# Patient Record
Sex: FEMALE | Race: WHITE | Employment: OTHER | URBAN - METROPOLITAN AREA
[De-identification: names, ages, dates, MRNs, and addresses within clinical notes are randomized per-mention and may not be internally consistent; named-entity substitution may affect disease eponyms.]

---

## 2021-09-03 ENCOUNTER — DOCUMENTATION ONLY (OUTPATIENT)
Dept: ONCOLOGY | Age: 86
End: 2021-09-03

## 2021-09-03 ENCOUNTER — OFFICE VISIT (OUTPATIENT)
Dept: ONCOLOGY | Age: 86
End: 2021-09-03
Payer: MEDICARE

## 2021-09-03 ENCOUNTER — TELEPHONE (OUTPATIENT)
Dept: ONCOLOGY | Age: 86
End: 2021-09-03

## 2021-09-03 VITALS
BODY MASS INDEX: 22.14 KG/M2 | SYSTOLIC BLOOD PRESSURE: 120 MMHG | TEMPERATURE: 98 F | RESPIRATION RATE: 18 BRPM | HEART RATE: 58 BPM | DIASTOLIC BLOOD PRESSURE: 64 MMHG | WEIGHT: 129 LBS

## 2021-09-03 DIAGNOSIS — C67.9 MALIGNANT NEOPLASM OF URINARY BLADDER, UNSPECIFIED SITE (HCC): Primary | ICD-10-CM

## 2021-09-03 DIAGNOSIS — C34.92 MALIGNANT NEOPLASM OF LEFT LUNG, UNSPECIFIED PART OF LUNG (HCC): ICD-10-CM

## 2021-09-03 PROCEDURE — 1101F PT FALLS ASSESS-DOCD LE1/YR: CPT | Performed by: INTERNAL MEDICINE

## 2021-09-03 PROCEDURE — 99205 OFFICE O/P NEW HI 60 MIN: CPT | Performed by: INTERNAL MEDICINE

## 2021-09-03 PROCEDURE — G8536 NO DOC ELDER MAL SCRN: HCPCS | Performed by: INTERNAL MEDICINE

## 2021-09-03 PROCEDURE — G8510 SCR DEP NEG, NO PLAN REQD: HCPCS | Performed by: INTERNAL MEDICINE

## 2021-09-03 PROCEDURE — G8427 DOCREV CUR MEDS BY ELIG CLIN: HCPCS | Performed by: INTERNAL MEDICINE

## 2021-09-03 PROCEDURE — G8420 CALC BMI NORM PARAMETERS: HCPCS | Performed by: INTERNAL MEDICINE

## 2021-09-03 PROCEDURE — G0463 HOSPITAL OUTPT CLINIC VISIT: HCPCS | Performed by: INTERNAL MEDICINE

## 2021-09-03 PROCEDURE — 1090F PRES/ABSN URINE INCON ASSESS: CPT | Performed by: INTERNAL MEDICINE

## 2021-09-03 RX ORDER — FELODIPINE 5 MG/1
5 TABLET, EXTENDED RELEASE ORAL DAILY
COMMUNITY

## 2021-09-03 RX ORDER — ALENDRONATE SODIUM 10 MG/1
TABLET ORAL
COMMUNITY

## 2021-09-03 RX ORDER — GLUCOSAMINE/CHONDR SU A SOD 750-600 MG
TABLET ORAL
COMMUNITY

## 2021-09-03 RX ORDER — BISMUTH SUBSALICYLATE 262 MG
1 TABLET,CHEWABLE ORAL DAILY
COMMUNITY

## 2021-09-03 RX ORDER — SPIRONOLACTONE 25 MG/1
TABLET ORAL DAILY
COMMUNITY

## 2021-09-03 RX ORDER — OMEPRAZOLE 20 MG/1
25 CAPSULE, DELAYED RELEASE ORAL DAILY
COMMUNITY

## 2021-09-03 NOTE — TELEPHONE ENCOUNTER
9/3/21 5:44 PM: Per request of Dr. Celina Haney, faxed request to [place] [address] Records for lung biopsy results and CT results. Fax confirmation was received.

## 2021-09-03 NOTE — PROGRESS NOTES
[facility] [place] at [place] [address] [address], [address] [address], [address] [place] [address] Samuel  W: [phone]  F: [phone]    Reason for Visit:   Kenn Goodrich is a 80 y.o. female who is seen in consultation at the request of Dr. Kira Camacho for evaluation of Non muscle invasive bladder cancer     Treatment History:   · None from us yet    History of Present Illness:   Patient is a 80 y.o. female who is seen for recurrent NMIBC    She was initially diagnosed with high grade NMIBC in  and then in  when she had TURBT and a 6 week course of BCG. She was also enrolled on the QUILT trial at a point. She then had a TURBT and upper tract evaluation in  and 2020 which again showed HG NMIBC. CT IVP 2020 was unremarkable. She had a recurrence 2021 and 2021 with CIS ( HG). She was taken off the QUILT trial. Plans are for   Maintenance with BCG+ INF x 3. She  Does not want a cystectomy and comes to review options. She feels well apart from fatigue. She has no pain, cough, fevers, chills, sweats, falls, HA, rashes. She has been following with thoracic surgery at [address] [place] [address] for multiple lung nodules which she has had since 2019 and these have been enlarging. CT chest 2020 showed that a JOYCE nodule had increased to 3.5 x 3 x 4.2 cm from 2.4 x 2 x 3.4 cm with a solid component. She had a Left VATS 2020 with Dr. Arlen hO and had a pT1b adenocarcinoma. She has a remote h/o Lymphoma and Also has stable scleroderma. Past Medical History:   Diagnosis Date    Cancer [facility])     lymphoma    Hypertension       Past Surgical History:   Procedure Laterality Date    HX HEENT      tonsillectomy    HX ORTHOPAEDIC  2014    lumbar fracture      Social History     Tobacco Use    Smoking status: Former Smoker     Packs/day: 0.50     Years: 20.00     Pack years: 10.00     Quit date: 4/15/1984     Years since quittin.4    Smokeless tobacco: Never Used   Substance Use Topics    Alcohol use:  No Family History   Problem Relation Age of Onset    Cancer Father      Current Outpatient Medications   Medication Sig    HYDROcodone-acetaminophen (NORCO) 5-325 mg per tablet Take 1 Tab by mouth every four (4) hours as needed.  losartan (COZAAR) 25 mg tablet Take 25 mg by mouth daily.  hydrochlorothiazide (HYDRODIURIL) 25 mg tablet Take 25 mg by mouth daily.  therapeutic multivitamin (THERAGRAN) tablet Take 1 Tab by mouth daily.  DOCOSAHEXANOIC ACID/EPA (FISH OIL PO) Take 1 Cap by mouth daily.  CALCIUM CARBONATE/VITAMIN D3 (CALCIUM + D PO) Take 1 Tab by mouth daily. No current facility-administered medications for this visit. Allergies   Allergen Reactions    Benadryl [Diphenhydramine Hcl] Other (comments)     hyperactive        Review of Systems: A complete review of systems was obtained, negative except as described above. Physical Exam:     Visit Vitals  /64 (BP 1 Location: Right arm, BP Patient Position: Sitting)   Pulse (!) 58   Temp 98 °F (36.7 °C)   Resp 18   Wt 129 lb (58.5 kg)   BMI 22.14 kg/m²     ECOG PS: 0  General: No distress  Eyes: PERRLA, anicteric sclerae  HENT: Atraumatic  Neck: Supple  Lymphatic: No cervical, supraclavicular, or inguinal adenopathy  Respiratory:  normal respiratory effort  CV: Normal rate, regular rhythm, no peripheral edema  GI: Soft, nontender  MS: Normal gait and station. Digits without clubbing or cyanosis. Skin: No rashes, ecchymoses, or petechiae. Normal temperature, turgor, and texture. Psych: Alert, oriented, appropriate affect, normal judgment/insight    Results:     Lab Results   Component Value Date/Time    WBC 4.9 04/15/2014 04:49 PM    HGB 12.5 04/15/2014 04:49 PM    HCT 37.2 04/15/2014 04:49 PM    PLATELET 495 (L) 78/16/7577 04:49 PM    MCV 87.9 04/15/2014 04:49 PM    ABS.  NEUTROPHILS 3.7 04/15/2014 04:49 PM     Lab Results   Component Value Date/Time    Sodium 139 04/15/2014 04:49 PM    Potassium 4.0 04/15/2014 04:49 PM Chloride 105 04/15/2014 04:49 PM    CO2 29 04/15/2014 04:49 PM    Glucose 135 (H) 04/15/2014 04:49 PM    BUN 14 04/15/2014 04:49 PM    Creatinine 0.66 04/15/2014 04:49 PM    GFR est AA >60 04/15/2014 04:49 PM    GFR est non-AA >60 04/15/2014 04:49 PM    Calcium 9.4 04/15/2014 04:49 PM     Lab Results   Component Value Date/Time    Bilirubin, total 0.6 04/15/2014 04:49 PM    ALT (SGPT) 23 04/15/2014 04:49 PM    Alk. phosphatase 58 04/15/2014 04:49 PM    Protein, total 7.1 04/15/2014 04:49 PM    Albumin 4.0 04/15/2014 04:49 PM    Globulin 3.1 04/15/2014 04:49 PM       External records from [facility] and   Records reviewed and summarized above. Pathology report(s) reviewed above. Radiology report(s) reviewed above. 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  She is on her maintenance BCG  with INF now    If she has residual/ BCG refractory disease on repeat Cystoscopy ( defined as persistent or recurrent CIS within 12 months of being treated with BCG) then would ideally need a cystectomy . This would be curative. Risk of recurrent disease in 1 year is -  30% CIS, 40%, muscle-invasive disease, and 20-30%  metastatic disease in BCG refractory disease.      Other palliative options were reviewed  Has been previously evaluated for the QUILT-3.032 trial and has had recurrence despite. Hence I would consider Jami Martines should she forgo surgery.     In the single-arm phase II trial (Janece Officer) that included 97 patients with BCG-unresponsive CIS, with or without papillary Pembrolizumab for up to two years led to acomplete response rate of 41 percent at three months and 19 percent at 15 months. At a median follow-up of 24 months, no patients had progressed to muscle invasive bladder cancer or metastatic disease prior to cystectomy.  Among the 38 patients with persistent or recurrent disease following pembrolizumab who underwent cystectomy, three had muscle invasive (T2) disease on pathology.     All questions answered    2) Lung cancer    S/p VATS Upper lobe wedge 11/2020- [id number]- Dr. Andrew Dhaliwal  Scans done 9/2/2021 and requested    3) H/O Lymphoma   Says that she was on Prednisone for 7 years     4) Scleroderma and Raynaud's    5) Hep C  Treated     6) Psychosocial  Takes care of   Supportive daughter  Met with SW    Plan:     · Continue BCG  · Has Cystoscopy in 8 weeks  · See me 10 weeks   · If she does decide to receive Keytruda then will get baseline scans    I appreciate the opportunity to participate in Ms. Bonita ribeiro.     Signed By: King Kristel MD

## 2021-09-03 NOTE — PROGRESS NOTES
Oncology Social Work  Psychosocial Assessment    Reason for Assessment:      [] Social Work Referral [x] Initial Assessment [] New Diagnosis [] Other    Advance Care Planning:  Patient has an AMD, but not on file. Sources of Information:    [x]Patient  [x]Family  []Staff  []Medical Record    Mental Status:    [x]Alert  []Lethargic  []Unresponsive   [] Unable to assess   Oriented to:  [x]Person  [x]Place  [x]Time  [x]Situation      Relationship Status:  []Single  [x]  []Significant Other/Life Partner  []  []  []    Living Circumstances:  []Lives Alone  [x]Family/Significant Other in Household  []Roommates  []Children in the Home  []Paid Caregivers  []Assisted Living Facility/Group Home  []Skilled [address]  []Homeless  []Incarcerated  []Environmental/Care Concerns  []Other:    Employment Status:  []Employed Full-time []Employed Part-time []Homemaker  [x] Retired [] Short-Term Disability [] [facility]  [] Unemployed     Barriers to Learning:    []Language  []Developmental  []Cognitive  []Altered Mental Status  []Visual/Hearing Impairment  []Unable to Read/Write  []Motivational  [] Challenges Understanding Medical Jargon [x]No Barriers Identified      Financial/Legal Concerns:    []Uninsured  []Limited Income/Resources  []Non-Citizen  []Food Insecurity [x]No Concerns Identified   []Other:    Sikhism/Spiritual/Existential:  Does patient have any spiritual or Buddhist beliefs? [x] Yes [] No  Is the patient involved in a spiritual, sarabjit or Buddhist community? [x] Yes [] No  Patient expressing spiritual/existential angst? [] Yes [x] No  Notes: Patient is an active member of [employer] [facility].       Support System:    Identified Support Person/Group:  [x]Strong  []Fair  []Limited    Coping with Illness:   [x]  Coping Well  [] Challenges Coping with Serious Illness [] Situational Depression [] Situational Anxiety [] Anticipatory Grief  [] Recent Loss [] Caregiver [place]            Narrative:   Met with the patient during her office visit today. Patient is being evaluated for Non muscle invasive bladder cancer. The patient has a PCP - Dr. Joanie Rivas. Living Situation - Patient lives with her , in a one story home with basement. She shared that she has to go to the basement to do laundry. Her  suffers from short term memory challenges, and she serves as his caregiver. She does not use any DME to ambulate, and is independent with all ADLs and IADLs. Employment Status - The patient is retired. She worked as  at [employer] for many years. Her  is also retired. Insurance - Medicare A&B, and [employer]. Social Support - The patient has good support from family, friends, and Taoism family. The patient's daughter, Renard Newton, participated in her office visit today, via phone. Renard Newton is an employee with [employer] in IT. Resources provided -  Invited the patient to the upcoming Virtual Support Group meetings, led by this . Provided this 's contact information as well as information regarding the complementary services provided by the [facility], explaining that the center is currently closed due to COVID-19 restrictions. Explained that meditation, yoga, relationship counseling, and music therapy, are being offered virtually. Plan:   1. Introduced self and role of this  in the [facility]. 2.  Informed the patient of the [facility] and available resources there. 3.  Continue to meet with the patient when she returns to the clinic for ongoing assessment of the patients adjustment to her diagnosis and treatment. 4.  Ongoing psychosocial support as desired by patient. Referral:   No referrals placed at this time.    Bi Wells LCSW

## 2021-09-03 NOTE — PROGRESS NOTES
Tonny Childs is a 80 y.o. female    Chief Complaint   Patient presents with    New Patient     Bladder Cancer       1. Have you been to the ER, urgent care clinic since your last visit? Hospitalized since your last visit? No    2. Have you seen or consulted any other health care providers outside of the [address] [place] [address] since your last visit? Include any pap smears or colon screening.  Yes, [facility]

## 2022-03-19 PROBLEM — C34.92 MALIGNANT NEOPLASM OF LEFT LUNG (HCC): Status: ACTIVE | Noted: 2021-09-03

## 2022-03-20 PROBLEM — C67.9 MALIGNANT NEOPLASM OF URINARY BLADDER (HCC): Status: ACTIVE | Noted: 2021-09-03

## 2022-06-01 ENCOUNTER — TELEPHONE (OUTPATIENT)
Dept: ONCOLOGY | Age: 87
End: 2022-06-01

## 2022-06-01 NOTE — TELEPHONE ENCOUNTER
[facility]) request, called patient regarding a follow up appt. Patient has confirmed:  6/9/22 @ 11am.  Left voicemail for [facility]) with appt date/time.

## 2022-06-01 NOTE — TELEPHONE ENCOUNTER
Called patient regarding follow up appt, no answer, left message for patient to call the office to reschedule follow up.

## 2022-06-09 ENCOUNTER — OFFICE VISIT (OUTPATIENT)
Dept: ONCOLOGY | Age: 87
End: 2022-06-09
Payer: MEDICARE

## 2022-06-09 VITALS
BODY MASS INDEX: 21.63 KG/M2 | TEMPERATURE: 98.3 F | SYSTOLIC BLOOD PRESSURE: 104 MMHG | WEIGHT: 126 LBS | RESPIRATION RATE: 18 BRPM | HEART RATE: 80 BPM | DIASTOLIC BLOOD PRESSURE: 62 MMHG

## 2022-06-09 DIAGNOSIS — C67.9 MALIGNANT NEOPLASM OF URINARY BLADDER, UNSPECIFIED SITE (HCC): Primary | ICD-10-CM

## 2022-06-09 PROCEDURE — G8536 NO DOC ELDER MAL SCRN: HCPCS | Performed by: INTERNAL MEDICINE

## 2022-06-09 PROCEDURE — 1123F ACP DISCUSS/DSCN MKR DOCD: CPT | Performed by: INTERNAL MEDICINE

## 2022-06-09 PROCEDURE — 99214 OFFICE O/P EST MOD 30 MIN: CPT | Performed by: INTERNAL MEDICINE

## 2022-06-09 PROCEDURE — G8420 CALC BMI NORM PARAMETERS: HCPCS | Performed by: INTERNAL MEDICINE

## 2022-06-09 PROCEDURE — G8427 DOCREV CUR MEDS BY ELIG CLIN: HCPCS | Performed by: INTERNAL MEDICINE

## 2022-06-09 PROCEDURE — 1090F PRES/ABSN URINE INCON ASSESS: CPT | Performed by: INTERNAL MEDICINE

## 2022-06-09 PROCEDURE — 1101F PT FALLS ASSESS-DOCD LE1/YR: CPT | Performed by: INTERNAL MEDICINE

## 2022-06-09 PROCEDURE — G0463 HOSPITAL OUTPT CLINIC VISIT: HCPCS | Performed by: INTERNAL MEDICINE

## 2022-06-09 PROCEDURE — G8432 DEP SCR NOT DOC, RNG: HCPCS | Performed by: INTERNAL MEDICINE

## 2022-06-09 NOTE — PROGRESS NOTES
Meño Lynn is a 80 y.o. female    Chief Complaint   Patient presents with    Follow-up      Non muscle invasive bladder cancer        1. Have you been to the ER, urgent care clinic since your last visit? Hospitalized since your last visit? No    2. Have you seen or consulted any other health care providers outside of the [address] [place] [place] [address] since your last visit? Include any pap smears or colon screening.  Yes, Dr[facility]

## 2022-06-09 NOTE — PROGRESS NOTES
[facility] [place] at [place] [address] [address], [address]  W: [phone]  F: [phone]    Reason for Visit:   Silvia Sierra is a 80 y.o. female who is seen  for evaluation of Non muscle invasive bladder cancer     Treatment History:   · None from us yet    History of Present Illness:   Patient is a 80 y.o. female who is seen for recurrent NMIBC    She was initially diagnosed with high grade NMIBC in  and then in  when she had TURBT and a 6 week course of BCG. She was also enrolled on the QUILT trial at a point. She then had a TURBT and upper tract evaluation in  and 2020 which again showed HG NMIBC. CT IVP 2020 was unremarkable. She had a recurrence 2021 and 2021 with CIS ( HG). She was taken off the QUILT trial. Was on Maintenance with BCG+ INF x 3. She  Does not want a cystectomy. Had another Cysto 2022 with residual CIS. She has no pain and no bleeding. She has been following with thoracic surgery at [facility] for multiple lung nodules which she has had since  and these have been enlarging. CT chest 2020 showed that a JOYCE nodule had increased to 3.5 x 3 x 4.2 cm from 2.4 x 2 x 3.4 cm with a solid component. She had a Left VATS 2020 with Dr. Shiraz Jones and had a pT1b adenocarcinoma. She has a remote h/o Lymphoma and Also has stable scleroderma. Her  is unwell and is totally dependent on her.      Past Medical History:   Diagnosis Date    Cancer [facility])     lymphoma    Hypertension       Past Surgical History:   Procedure Laterality Date    HX HEENT      tonsillectomy    HX ORTHOPAEDIC  2014    lumbar fracture      Social History     Tobacco Use    Smoking status: Former Smoker     Packs/day: 0.50     Years: 20.00     Pack years: 10.00     Quit date: 4/15/1984     Years since quittin.1    Smokeless tobacco: Never Used   Substance Use Topics    Alcohol use: No      Family History   Problem Relation Age of Onset    Cancer Father      Current Outpatient Medications   Medication Sig    felodipine (PLENDIL SR) 5 mg 24 hr tablet Take 5 mg by mouth daily.  spironolactone (ALDACTONE) 25 mg tablet Take  by mouth daily.  omeprazole (PRILOSEC) 20 mg capsule Take 25 mg by mouth daily.  alendronate (FOSAMAX) 10 mg tablet Take  by mouth Daily (before breakfast).  multivitamin (ONE A DAY) tablet Take 1 Tablet by mouth daily.  Biotin 2,500 mcg cap Take  by mouth.  bimatoprost (LUMIGAN) 0.01 % ophthalmic drops Administer 1 Drop to both eyes every evening.  HYDROcodone-acetaminophen (NORCO) 5-325 mg per tablet Take 1 Tab by mouth every four (4) hours as needed. (Patient not taking: Reported on 9/3/2021)    losartan (COZAAR) 25 mg tablet Take 25 mg by mouth daily.  hydrochlorothiazide (HYDRODIURIL) 25 mg tablet Take 25 mg by mouth daily.  therapeutic multivitamin (THERAGRAN) tablet Take 1 Tab by mouth daily. (Patient not taking: Reported on 9/3/2021)    DOCOSAHEXANOIC ACID/EPA (FISH OIL PO) Take 1 Cap by mouth daily. (Patient not taking: Reported on 9/3/2021)    CALCIUM CARBONATE/VITAMIN D3 (CALCIUM + D PO) Take 1 Tab by mouth daily. No current facility-administered medications for this visit. Allergies   Allergen Reactions    Benadryl [Diphenhydramine Hcl] Other (comments)     hyperactive        Review of Systems: A complete review of systems was obtained, negative except as described above. Physical Exam:     Visit Vitals  /62 (BP 1 Location: Left upper arm, BP Patient Position: Sitting)   Pulse 80   Temp 98.3 °F (36.8 °C)   Resp 18   Wt 126 lb (57.2 kg)   BMI 21.63 kg/m²     ECOG PS: 0  General: No distress  Eyes: PERRLA, anicteric sclerae  HENT: Atraumatic  Neck: Supple  Skin: No rashes, ecchymoses, or petechiae. Normal temperature, turgor, and texture.   Psych: Alert, oriented, appropriate affect, normal judgment/insight    Results:     Lab Results   Component Value Date/Time    WBC 4.9 04/15/2014 04:49 PM    HGB 12.5 04/15/2014 04:49 PM    HCT 37.2 04/15/2014 04:49 PM    PLATELET 667 (L) 62/37/5255 04:49 PM    MCV 87.9 04/15/2014 04:49 PM    ABS. NEUTROPHILS 3.7 04/15/2014 04:49 PM     Lab Results   Component Value Date/Time    Sodium 139 04/15/2014 04:49 PM    Potassium 4.0 04/15/2014 04:49 PM    Chloride 105 04/15/2014 04:49 PM    CO2 29 04/15/2014 04:49 PM    Glucose 135 (H) 04/15/2014 04:49 PM    BUN 14 04/15/2014 04:49 PM    Creatinine 0.66 04/15/2014 04:49 PM    GFR est AA >60 04/15/2014 04:49 PM    GFR est non-AA >60 04/15/2014 04:49 PM    Calcium 9.4 04/15/2014 04:49 PM     Lab Results   Component Value Date/Time    Bilirubin, total 0.6 04/15/2014 04:49 PM    ALT (SGPT) 23 04/15/2014 04:49 PM    Alk. phosphatase 58 04/15/2014 04:49 PM    Protein, total 7.1 04/15/2014 04:49 PM    Albumin 4.0 04/15/2014 04:49 PM    Globulin 3.1 04/15/2014 04:49 PM       External records from [facility]  Records reviewed and summarized above. Pathology report(s) reviewed above. Radiology report(s) reviewed above. CT 3/2022   In this patient with history of hematuria, no suspicious renal mass is identified. Multiple subcentimeter cortical renal cysts are noted bilaterally, too small to characterize. No renal or ureteral calculus. A few punctate vascular calcifications associated with both kidneys. 2.  Distal left ureter is mildly dilated just prior to reaching the left ureterovesical junction. Both the distal right ureter and the distal left ureter appear to be narrowed as they reach their respective ureterovesical junctions. No visible mass appreciated. Please correlate clinically, consider cystoscopy. 3.  Pancolonic diverticulosis without evidence of diverticulitis. 4.  Extensive atherosclerotic aortic calcifications. 5.  No lymphadenopathy. 6.  Pectus deformity noted.  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  She is on her maintenance BCG  with INF now  5/2022 has recurrent HG CIS also involves the left ureteral orifice. Has BCG refractory disease on repeat Cystoscopy ( defined as persistent or recurrent CIS within 12 months of being treated with BCG), would ideally need a cystectomy . This would be curative. Risk of recurrent disease in 1 year is -  30% CIS, 40%, muscle-invasive disease, and 20-30%  metastatic disease in BCG refractory disease.      Other palliative options were reviewed  Has been previously evaluated for the QUILT-3.032 trial and has had recurrence despite. Hence I would consider [facility] should she forgo surgery.     In the single-arm phase II trial (Rahul Moreno) that included 97 patients with BCG-unresponsive CIS,  Pembrolizumab for up to two years led to acomplete response rate of 41 percent at three months and 19 percent at 15 months. At a median follow-up of 24 months, no patients had progressed to muscle invasive bladder cancer or metastatic disease prior to cystectomy. Among the 38 patients with persistent or recurrent disease following pembrolizumab who underwent cystectomy, three had muscle invasive (T2) disease on pathology.     All questions answered    I had a long discussion about Keytruda and side effects, including some risk of major/ permanent side effects  She is neither inclined to cystectomy nor Keytruda  She will revisit options with Dr. Rita Spencer and get back to me     2) Lung cancer    S/p VATS Upper lobe wedge 11/2020- [id number]- Dr. Sandoval Bur  Scans done 3/2022     3) H/O Lymphoma   Says that she was on Prednisone for 7 years     4) Scleroderma and Raynaud's    5) Hep C  Treated     6) Psychosocial  Takes care of   Supportive daughter  Plan:       · If she does decide to receive Keytruda then she will call our office.  Wants to talk to  Loki Prasad     I appreciate the opportunity to participate in Ms. Lynn Saliva care.     Signed By: Marnie Lua MD

## 2023-05-16 RX ORDER — HYDROCHLOROTHIAZIDE 25 MG/1
25 TABLET ORAL DAILY
COMMUNITY

## 2023-05-16 RX ORDER — HYDROCODONE BITARTRATE AND ACETAMINOPHEN 5; 325 MG/1; MG/1
1 TABLET ORAL EVERY 4 HOURS PRN
COMMUNITY
Start: 2014-04-17

## 2023-05-16 RX ORDER — OMEPRAZOLE 20 MG/1
25 CAPSULE, DELAYED RELEASE ORAL DAILY
COMMUNITY

## 2023-05-16 RX ORDER — FELODIPINE 5 MG/1
5 TABLET, EXTENDED RELEASE ORAL DAILY
COMMUNITY

## 2023-05-16 RX ORDER — ALENDRONATE SODIUM 10 MG/1
TABLET ORAL
COMMUNITY

## 2023-05-16 RX ORDER — SPIRONOLACTONE 25 MG/1
TABLET ORAL DAILY
COMMUNITY

## 2023-05-16 RX ORDER — LOSARTAN POTASSIUM 25 MG/1
25 TABLET ORAL DAILY
COMMUNITY

## 2023-05-30 ENCOUNTER — APPOINTMENT (OUTPATIENT)
Facility: HOSPITAL | Age: 88
End: 2023-05-30
Attending: STUDENT IN AN ORGANIZED HEALTH CARE EDUCATION/TRAINING PROGRAM

## 2023-05-30 ENCOUNTER — HOSPITAL ENCOUNTER (EMERGENCY)
Facility: HOSPITAL | Age: 88
Discharge: LEFT BEFORE DISCHARGE | End: 2023-05-30

## 2023-05-30 VITALS
BODY MASS INDEX: 22.72 KG/M2 | RESPIRATION RATE: 16 BRPM | TEMPERATURE: 97.5 F | DIASTOLIC BLOOD PRESSURE: 82 MMHG | HEIGHT: 62 IN | SYSTOLIC BLOOD PRESSURE: 139 MMHG | OXYGEN SATURATION: 98 % | WEIGHT: 123.46 LBS | HEART RATE: 69 BPM

## 2023-05-30 PROCEDURE — 73562 X-RAY EXAM OF KNEE 3: CPT

## 2023-05-30 PROCEDURE — 99283 EMERGENCY DEPT VISIT LOW MDM: CPT

## 2023-05-30 PROCEDURE — 4500000002 HC ER NO CHARGE

## 2023-05-30 ASSESSMENT — PAIN DESCRIPTION - DESCRIPTORS: DESCRIPTORS: ACHING

## 2023-05-30 ASSESSMENT — PAIN SCALES - GENERAL: PAINLEVEL_OUTOF10: 5

## 2023-05-30 ASSESSMENT — PAIN - FUNCTIONAL ASSESSMENT: PAIN_FUNCTIONAL_ASSESSMENT: 0-10

## 2023-05-30 ASSESSMENT — PAIN DESCRIPTION - ORIENTATION: ORIENTATION: LEFT

## 2023-05-30 ASSESSMENT — PAIN DESCRIPTION - FREQUENCY: FREQUENCY: INTERMITTENT

## 2023-05-30 ASSESSMENT — PAIN DESCRIPTION - LOCATION: LOCATION: KNEE

## 2023-05-30 NOTE — ED TRIAGE NOTES
Patient is ambulatory in Triage with a walker. Reports fall yesterday forward, tripped, pain to the left knee, no blood thinner medications.

## 2023-06-26 ENCOUNTER — TELEPHONE (OUTPATIENT)
Age: 88
End: 2023-06-26

## 2023-08-10 ENCOUNTER — TELEPHONE (OUTPATIENT)
Age: 88
End: 2023-08-10

## 2024-02-26 ENCOUNTER — OFFICE VISIT (OUTPATIENT)
Facility: CLINIC | Age: 89
End: 2024-02-26
Payer: MEDICARE

## 2024-02-26 DIAGNOSIS — S72.002D CLOSED FRACTURE OF LEFT HIP WITH ROUTINE HEALING, SUBSEQUENT ENCOUNTER: Primary | ICD-10-CM

## 2024-02-26 PROBLEM — S72.002A CLOSED FRACTURE OF LEFT HIP (HCC): Status: ACTIVE | Noted: 2024-02-26

## 2024-02-26 PROCEDURE — G8484 FLU IMMUNIZE NO ADMIN: HCPCS | Performed by: INTERNAL MEDICINE

## 2024-02-26 PROCEDURE — 99306 1ST NF CARE HIGH MDM 50: CPT | Performed by: INTERNAL MEDICINE

## 2024-02-26 PROCEDURE — 1123F ACP DISCUSS/DSCN MKR DOCD: CPT | Performed by: INTERNAL MEDICINE

## 2024-02-26 NOTE — PROGRESS NOTES
PLACE OF SERVICE:  [place] [address] [place] [address]    H & P    Chief Complaint:    Left hip fracture  Hyponatremia      HPI : Lesly Ybarra is a 88 y.o. female history of hypertension anxiety bladder CA who fell at home while trying to lift her  who had fallen to the floor.  Patient landed on the floor hit her left hip.  She was brought to the emergency room and imaging confirmed a left hip intertrochanteric fracture.  She also had urinary retention Garcias was placed with 500 cc of urine collected in the urine bag.  Patient was admitted to the trauma team and seen by orthopedic underwent repair of left hip fracture with IM nail.  Postoperative course was complicated by acute blood loss anemia required 1 units of packed cells.  Patient also became hyponatremic serum sodium dropping to 113.  She was seen by nephrology HCTZ spironolactone were discontinued she was given 100 cc of 3% saline.  Hyponatremia was corrected slowly over the next few days sodium was 127 at the time of discharge.  Patient was also seen by psychiatry because of depression and anxiety.  SSRI Zoloft were discontinued due to hyponatremia.  Patient is admitted here for skilled rehab.  She has been complaining of lower abdominal discomfort not associated with urination.  Upon interrogation with the nursing staff and daughter she tells me she did void yesterday.  There is no constipation or diarrhea.  Daughter tells me she also has back pain.  Order new mattress and if needed Lidoderm patch.  Patient is on oxycodone 3 times a day as needed avoiding escalating the pain medicine due to concern for sedation.    PMH:   Hypertension  Bladder cancer  Anxiety depression  Osteoporosis    ROS:   The following system review was negative:  Constitutional; Respiratory; Cardiovascular; Genitourinary; Gastrointestinal; Psychiatric; Ear-Nose-Throat; Musculoskeletal; Neurologic; Endocrine; Hematologic; Skin; Eyes; denies

## 2024-02-27 ENCOUNTER — OFFICE VISIT (OUTPATIENT)
Facility: CLINIC | Age: 89
End: 2024-02-27
Payer: MEDICARE

## 2024-02-27 DIAGNOSIS — M62.81 MUSCLE WEAKNESS: Primary | ICD-10-CM

## 2024-02-27 DIAGNOSIS — C34.92 MALIGNANT NEOPLASM OF UNSPECIFIED PART OF LEFT BRONCHUS OR LUNG (HCC): ICD-10-CM

## 2024-02-27 PROCEDURE — 1123F ACP DISCUSS/DSCN MKR DOCD: CPT | Performed by: INTERNAL MEDICINE

## 2024-02-27 PROCEDURE — 99309 SBSQ NF CARE MODERATE MDM 30: CPT | Performed by: INTERNAL MEDICINE

## 2024-02-27 PROCEDURE — G8484 FLU IMMUNIZE NO ADMIN: HCPCS | Performed by: INTERNAL MEDICINE

## 2024-02-28 ENCOUNTER — OFFICE VISIT (OUTPATIENT)
Facility: CLINIC | Age: 89
End: 2024-02-28
Payer: MEDICARE

## 2024-02-28 DIAGNOSIS — S72.002D CLOSED FRACTURE OF LEFT HIP WITH ROUTINE HEALING, SUBSEQUENT ENCOUNTER: Primary | ICD-10-CM

## 2024-02-28 PROCEDURE — 1123F ACP DISCUSS/DSCN MKR DOCD: CPT | Performed by: INTERNAL MEDICINE

## 2024-02-28 PROCEDURE — 99309 SBSQ NF CARE MODERATE MDM 30: CPT | Performed by: INTERNAL MEDICINE

## 2024-02-28 PROCEDURE — G8484 FLU IMMUNIZE NO ADMIN: HCPCS | Performed by: INTERNAL MEDICINE

## 2024-02-28 NOTE — PROGRESS NOTES
PLACE OF SERVICE:  [facility] [address] [place] [address]    2/28/2024      Chief Complaint:    Left hip fracture  Hyponatremia      HPI : Lesly Ybarra is a 88 y.o. female history of hypertension anxiety bladder CA who fell at home while trying to lift her  who had fallen to the floor.  Patient landed on the floor hit her left hip.  She was brought to the emergency room and imaging confirmed a left hip intertrochanteric fracture.  She also had urinary retention Garcias was placed with 500 cc of urine collected in the urine bag.  Patient was admitted to the trauma team and seen by orthopedic underwent repair of left hip fracture with IM nail.  Postoperative course was complicated by acute blood loss anemia required 1 units of packed cells.  Patient is seen for skilled visit.  Does not complain of significant hip pain.  She has a lot of anxiety her  who is present at this facility not doing well and comfort measures.  Daughter is asking for some anxiety medicine for her.  Patient has been voiding.  No urinary retention.    PMH:   Hypertension  Bladder cancer  Anxiety depression  Osteoporosis    ROS:   The following system review was negative:  Constitutional; Respiratory; Cardiovascular; Genitourinary; Gastrointestinal; Psychiatric; Ear-Nose-Throat; Musculoskeletal; Neurologic; Endocrine; Hematologic; Skin; Eyes; denies any    Medications: Reviewed in EMR and assessment and plan    Social History:    Family History:  non contributory     Code Status:Full code    Allergies: Reviewed  Allergies   Allergen Reactions    Amlodipine Cough     Reaction Type: Side Effect    Diphenhydramine Other (See Comments)     hyperactive    Lisinopril Cough       Medications: Reviewed    Vitals: 138/62 P 78 R 16 Temp 97.3      Exam:  Constitutional: No acute distress;   Eyes: Sclera clear, PERRLA;   Ears/nose/mouth/throat:mmm, OP clear, trachea midline;  Cardiovascular: RRR,nml S1 and S2,

## 2024-02-28 NOTE — PROGRESS NOTES
PLACE OF SERVICE:  [facility] [address] [place] [address]    SKILLED VISIT    2/27/2024      Chief Complaint:    Left hip fracture  Hyponatremia      HPI : Lesly Ybarra is a 88 y.o. female history of hypertension anxiety bladder CA who fell at home while trying to lift her  who had fallen to the floor.  Patient landed on the floor hit her left hip.  She was brought to the emergency room and imaging confirmed a left hip intertrochanteric fracture.  She also had urinary retention Garcias was placed with 500 cc of urine collected in the urine bag.  Patient was admitted to the trauma team and seen by orthopedic underwent repair of left hip fracture with IM nail.  Postoperative course was complicated by acute blood loss anemia required 1 units of packed cells.  Patient also became hyponatremic serum sodium dropping to 113.  She was seen by nephrology HCTZ spironolactone were discontinued she was given 100 cc of 3% saline.  Hyponatremia was corrected slowly over the next few days sodium was 127 at the time of discharge.  Patient was also seen by psychiatry because of depression and anxiety.  SSRI Zoloft were discontinued due to hyponatremia.  Patient is admitted here for skilled rehab.  She is seen for follow-up.  She is complaining of urinary frequency no hematuria no dysuria no flank pain she has been voiding okay.  Will add oxybutynin for overactive bladder.    PMH:   Hypertension  Bladder cancer  Anxiety depression  Osteoporosis    ROS:   The following system review was negative:  Constitutional; Respiratory; Cardiovascular; Genitourinary; Gastrointestinal; Psychiatric; Ear-Nose-Throat; Musculoskeletal; Neurologic; Endocrine; Hematologic; Skin; Eyes; denies any    Medications: Reviewed in EMR and assessment and plan    Social History:    Family History:  non contributory     Code Status:Full code    Allergies: Reviewed  Allergies   Allergen Reactions    Amlodipine Cough

## 2024-02-29 ENCOUNTER — OFFICE VISIT (OUTPATIENT)
Facility: CLINIC | Age: 89
End: 2024-02-29

## 2024-02-29 VITALS
BODY MASS INDEX: 21.03 KG/M2 | HEART RATE: 85 BPM | SYSTOLIC BLOOD PRESSURE: 94 MMHG | OXYGEN SATURATION: 93 % | WEIGHT: 115 LBS | DIASTOLIC BLOOD PRESSURE: 60 MMHG | RESPIRATION RATE: 18 BRPM | TEMPERATURE: 97 F

## 2024-02-29 DIAGNOSIS — F32.A ANXIETY AND DEPRESSION: ICD-10-CM

## 2024-02-29 DIAGNOSIS — D64.9 ANEMIA, UNSPECIFIED TYPE: ICD-10-CM

## 2024-02-29 DIAGNOSIS — I10 PRIMARY HYPERTENSION: ICD-10-CM

## 2024-02-29 DIAGNOSIS — S72.002D CLOSED FRACTURE OF LEFT HIP WITH ROUTINE HEALING, SUBSEQUENT ENCOUNTER: ICD-10-CM

## 2024-02-29 DIAGNOSIS — E87.1 HYPONATREMIA: ICD-10-CM

## 2024-02-29 DIAGNOSIS — F41.9 ANXIETY AND DEPRESSION: ICD-10-CM

## 2024-02-29 DIAGNOSIS — N32.89 BLADDER SPASM: ICD-10-CM

## 2024-02-29 ASSESSMENT — ENCOUNTER SYMPTOMS
GASTROINTESTINAL NEGATIVE: 1
RESPIRATORY NEGATIVE: 1

## 2024-02-29 NOTE — PROGRESS NOTES
were discontinued in hospita due to hyponatremia.   3. Hyponatremia  Overview:  During hospitalization. Corrected with 3% saline.  Sodium on hospital discharge was 127  Repeat BMP ordered 3/4/24.   4. Anxiety and depression  Overview:  Continue zoloft  Continue lorazepam 0.5 every 12 as needed  Monitor mood and behavior  5. Anemia, unspecified type  Overview:  Due to acute blood loss   Required 1 unit PRBCs in hospital  Hemoglobin on discharge 8.9  Continue PO iron  Repeat CBC ordered  No overt signs of bleeding noted.  6. Bladder spasm  Overview:  Continue oxybutyin 5mg BID.  UA with culture to r/o UTI             TENZIN Waller - NP

## 2024-03-01 ENCOUNTER — OFFICE VISIT (OUTPATIENT)
Facility: CLINIC | Age: 89
End: 2024-03-01

## 2024-03-01 ENCOUNTER — HOSPITAL ENCOUNTER (INPATIENT)
Facility: HOSPITAL | Age: 89
LOS: 6 days | Discharge: NURSING, REHAB OR LONG-TERM CARE | DRG: 643 | End: 2024-03-07
Attending: STUDENT IN AN ORGANIZED HEALTH CARE EDUCATION/TRAINING PROGRAM | Admitting: INTERNAL MEDICINE
Payer: MEDICARE

## 2024-03-01 ENCOUNTER — APPOINTMENT (OUTPATIENT)
Facility: HOSPITAL | Age: 89
DRG: 643 | End: 2024-03-01
Payer: MEDICARE

## 2024-03-01 DIAGNOSIS — S72.002D CLOSED FRACTURE OF LEFT HIP WITH ROUTINE HEALING, SUBSEQUENT ENCOUNTER: Primary | ICD-10-CM

## 2024-03-01 DIAGNOSIS — R40.0 SOMNOLENCE: ICD-10-CM

## 2024-03-01 DIAGNOSIS — E87.1 HYPONATREMIA: Primary | ICD-10-CM

## 2024-03-01 DIAGNOSIS — N30.00 ACUTE CYSTITIS WITHOUT HEMATURIA: ICD-10-CM

## 2024-03-01 DIAGNOSIS — M25.552 PAIN OF LEFT HIP: ICD-10-CM

## 2024-03-01 LAB
ALBUMIN SERPL-MCNC: 2.8 G/DL (ref 3.5–5)
ALBUMIN/GLOB SERPL: 0.8 (ref 1.1–2.2)
ALP SERPL-CCNC: 101 U/L (ref 45–117)
ALT SERPL-CCNC: 26 U/L (ref 12–78)
ANION GAP BLD CALC-SCNC: 9 (ref 10–20)
ANION GAP SERPL CALC-SCNC: 7 MMOL/L (ref 5–15)
ANION GAP SERPL CALC-SCNC: 8 MMOL/L (ref 5–15)
APPEARANCE UR: CLEAR
AST SERPL-CCNC: 28 U/L (ref 15–37)
BACTERIA URNS QL MICRO: ABNORMAL /HPF
BASE DEFICIT BLD-SCNC: 2.3 MMOL/L
BASOPHILS # BLD: 0 K/UL (ref 0–0.1)
BASOPHILS NFR BLD: 0 % (ref 0–1)
BILIRUB SERPL-MCNC: 1.5 MG/DL (ref 0.2–1)
BILIRUB UR QL: NEGATIVE
BUN SERPL-MCNC: 11 MG/DL (ref 6–20)
BUN SERPL-MCNC: 11 MG/DL (ref 6–20)
BUN/CREAT SERPL: 26 (ref 12–20)
BUN/CREAT SERPL: 31 (ref 12–20)
CA-I BLD-MCNC: 1.2 MMOL/L (ref 1.12–1.32)
CALCIUM SERPL-MCNC: 8.1 MG/DL (ref 8.5–10.1)
CALCIUM SERPL-MCNC: 8.7 MG/DL (ref 8.5–10.1)
CHLORIDE BLD-SCNC: 85 MMOL/L (ref 100–108)
CHLORIDE SERPL-SCNC: 86 MMOL/L (ref 97–108)
CHLORIDE SERPL-SCNC: 93 MMOL/L (ref 97–108)
CO2 BLD-SCNC: 21 MMOL/L (ref 19–24)
CO2 SERPL-SCNC: 21 MMOL/L (ref 21–32)
CO2 SERPL-SCNC: 23 MMOL/L (ref 21–32)
COLOR UR: ABNORMAL
COMMENT:: NORMAL
CREAT SERPL-MCNC: 0.35 MG/DL (ref 0.55–1.02)
CREAT SERPL-MCNC: 0.43 MG/DL (ref 0.55–1.02)
CREAT UR-MCNC: 0.4 MG/DL (ref 0.6–1.3)
DIFFERENTIAL METHOD BLD: ABNORMAL
EKG ATRIAL RATE: 95 BPM
EKG DIAGNOSIS: NORMAL
EKG P AXIS: 64 DEGREES
EKG P-R INTERVAL: 196 MS
EKG Q-T INTERVAL: 360 MS
EKG QRS DURATION: 80 MS
EKG QTC CALCULATION (BAZETT): 452 MS
EKG R AXIS: -15 DEGREES
EKG T AXIS: 25 DEGREES
EKG VENTRICULAR RATE: 95 BPM
EOSINOPHIL # BLD: 0 K/UL (ref 0–0.4)
EOSINOPHIL NFR BLD: 0 % (ref 0–7)
EPITH CASTS URNS QL MICRO: ABNORMAL /LPF
ERYTHROCYTE [DISTWIDTH] IN BLOOD BY AUTOMATED COUNT: 15.4 % (ref 11.5–14.5)
GLOBULIN SER CALC-MCNC: 3.5 G/DL (ref 2–4)
GLUCOSE BLD STRIP.AUTO-MCNC: 162 MG/DL (ref 65–117)
GLUCOSE BLD STRIP.AUTO-MCNC: 69 MG/DL (ref 65–117)
GLUCOSE BLD STRIP.AUTO-MCNC: 75 MG/DL (ref 74–106)
GLUCOSE SERPL-MCNC: 74 MG/DL (ref 65–100)
GLUCOSE SERPL-MCNC: 79 MG/DL (ref 65–100)
GLUCOSE UR STRIP.AUTO-MCNC: NEGATIVE MG/DL
HCO3 BLDA-SCNC: 22 MMOL/L
HCT VFR BLD AUTO: 27.7 % (ref 35–47)
HGB BLD-MCNC: 9.8 G/DL (ref 11.5–16)
HGB UR QL STRIP: NEGATIVE
HYALINE CASTS URNS QL MICRO: ABNORMAL /LPF (ref 0–5)
IMM GRANULOCYTES # BLD AUTO: 0.1 K/UL (ref 0–0.04)
IMM GRANULOCYTES NFR BLD AUTO: 1 % (ref 0–0.5)
KETONES UR QL STRIP.AUTO: 15 MG/DL
LACTATE BLD-SCNC: 0.68 MMOL/L (ref 0.4–2)
LEUKOCYTE ESTERASE UR QL STRIP.AUTO: ABNORMAL
LYMPHOCYTES # BLD: 0.2 K/UL (ref 0.8–3.5)
LYMPHOCYTES NFR BLD: 2 % (ref 12–49)
MAGNESIUM SERPL-MCNC: 1.9 MG/DL (ref 1.6–2.4)
MCH RBC QN AUTO: 31.1 PG (ref 26–34)
MCHC RBC AUTO-ENTMCNC: 35.4 G/DL (ref 30–36.5)
MCV RBC AUTO: 87.9 FL (ref 80–99)
MONOCYTES # BLD: 0.8 K/UL (ref 0–1)
MONOCYTES NFR BLD: 8 % (ref 5–13)
NEUTS SEG # BLD: 8.9 K/UL (ref 1.8–8)
NEUTS SEG NFR BLD: 89 % (ref 32–75)
NITRITE UR QL STRIP.AUTO: POSITIVE
NRBC # BLD: 0 K/UL (ref 0–0.01)
NRBC BLD-RTO: 0 PER 100 WBC
OSMOLALITY SERPL: 248 MOSM/KG H2O
OSMOLALITY UR: 300 MOSM/KG H2O
OSMOLALITY UR: 300 MOSM/KG H2O
PCO2 BLDV: 33.2 MMHG (ref 41–51)
PH BLDV: 7.42 (ref 7.32–7.42)
PH UR STRIP: 6.5 (ref 5–8)
PHOSPHATE SERPL-MCNC: 2.2 MG/DL (ref 2.6–4.7)
PLATELET # BLD AUTO: 364 K/UL (ref 150–400)
PMV BLD AUTO: 8.8 FL (ref 8.9–12.9)
PO2 BLDV: <27 MMHG (ref 25–40)
POTASSIUM BLD-SCNC: 4.2 MMOL/L (ref 3.5–5.5)
POTASSIUM SERPL-SCNC: 3.7 MMOL/L (ref 3.5–5.1)
POTASSIUM SERPL-SCNC: 4 MMOL/L (ref 3.5–5.1)
PROT SERPL-MCNC: 6.3 G/DL (ref 6.4–8.2)
PROT UR STRIP-MCNC: ABNORMAL MG/DL
RBC # BLD AUTO: 3.15 M/UL (ref 3.8–5.2)
RBC #/AREA URNS HPF: ABNORMAL /HPF (ref 0–5)
RBC MORPH BLD: ABNORMAL
SERVICE CMNT-IMP: ABNORMAL
SERVICE CMNT-IMP: ABNORMAL
SERVICE CMNT-IMP: NORMAL
SODIUM BLD-SCNC: 115 MMOL/L (ref 136–145)
SODIUM SERPL-SCNC: 117 MMOL/L (ref 136–145)
SODIUM SERPL-SCNC: 121 MMOL/L (ref 136–145)
SODIUM UR-SCNC: 18 MMOL/L
SODIUM UR-SCNC: 19 MMOL/L
SP GR UR REFRACTOMETRY: 1.01 (ref 1–1.03)
SPECIMEN HOLD: NORMAL
SPECIMEN HOLD: NORMAL
SPECIMEN SITE: ABNORMAL
UROBILINOGEN UR QL STRIP.AUTO: 1 EU/DL (ref 0.2–1)
WBC # BLD AUTO: 10 K/UL (ref 3.6–11)
WBC URNS QL MICRO: ABNORMAL /HPF (ref 0–4)

## 2024-03-01 PROCEDURE — 81001 URINALYSIS AUTO W/SCOPE: CPT

## 2024-03-01 PROCEDURE — 96360 HYDRATION IV INFUSION INIT: CPT

## 2024-03-01 PROCEDURE — 6360000002 HC RX W HCPCS: Performed by: INTERNAL MEDICINE

## 2024-03-01 PROCEDURE — 36415 COLL VENOUS BLD VENIPUNCTURE: CPT

## 2024-03-01 PROCEDURE — 84132 ASSAY OF SERUM POTASSIUM: CPT

## 2024-03-01 PROCEDURE — 87086 URINE CULTURE/COLONY COUNT: CPT

## 2024-03-01 PROCEDURE — 85025 COMPLETE CBC W/AUTO DIFF WBC: CPT

## 2024-03-01 PROCEDURE — 84295 ASSAY OF SERUM SODIUM: CPT

## 2024-03-01 PROCEDURE — 83735 ASSAY OF MAGNESIUM: CPT

## 2024-03-01 PROCEDURE — 6360000002 HC RX W HCPCS: Performed by: STUDENT IN AN ORGANIZED HEALTH CARE EDUCATION/TRAINING PROGRAM

## 2024-03-01 PROCEDURE — 84300 ASSAY OF URINE SODIUM: CPT

## 2024-03-01 PROCEDURE — 70450 CT HEAD/BRAIN W/O DYE: CPT

## 2024-03-01 PROCEDURE — 2580000003 HC RX 258: Performed by: STUDENT IN AN ORGANIZED HEALTH CARE EDUCATION/TRAINING PROGRAM

## 2024-03-01 PROCEDURE — 84100 ASSAY OF PHOSPHORUS: CPT

## 2024-03-01 PROCEDURE — 2580000003 HC RX 258: Performed by: NURSE PRACTITIONER

## 2024-03-01 PROCEDURE — 82330 ASSAY OF CALCIUM: CPT

## 2024-03-01 PROCEDURE — 80053 COMPREHEN METABOLIC PANEL: CPT

## 2024-03-01 PROCEDURE — 99285 EMERGENCY DEPT VISIT HI MDM: CPT

## 2024-03-01 PROCEDURE — 87077 CULTURE AEROBIC IDENTIFY: CPT

## 2024-03-01 PROCEDURE — 82962 GLUCOSE BLOOD TEST: CPT

## 2024-03-01 PROCEDURE — 82947 ASSAY GLUCOSE BLOOD QUANT: CPT

## 2024-03-01 PROCEDURE — 83935 ASSAY OF URINE OSMOLALITY: CPT

## 2024-03-01 PROCEDURE — 93005 ELECTROCARDIOGRAM TRACING: CPT | Performed by: STUDENT IN AN ORGANIZED HEALTH CARE EDUCATION/TRAINING PROGRAM

## 2024-03-01 PROCEDURE — 2580000003 HC RX 258: Performed by: INTERNAL MEDICINE

## 2024-03-01 PROCEDURE — 87186 SC STD MICRODIL/AGAR DIL: CPT

## 2024-03-01 PROCEDURE — 2000000000 HC ICU R&B

## 2024-03-01 PROCEDURE — 83930 ASSAY OF BLOOD OSMOLALITY: CPT

## 2024-03-01 PROCEDURE — 82803 BLOOD GASES ANY COMBINATION: CPT

## 2024-03-01 PROCEDURE — 71045 X-RAY EXAM CHEST 1 VIEW: CPT

## 2024-03-01 RX ORDER — SODIUM CHLORIDE 0.9 % (FLUSH) 0.9 %
5-40 SYRINGE (ML) INJECTION PRN
Status: DISCONTINUED | OUTPATIENT
Start: 2024-03-01 | End: 2024-03-07 | Stop reason: HOSPADM

## 2024-03-01 RX ORDER — SERTRALINE HYDROCHLORIDE 25 MG/1
25 TABLET, FILM COATED ORAL DAILY
Status: ON HOLD | COMMUNITY
End: 2024-03-06 | Stop reason: HOSPADM

## 2024-03-01 RX ORDER — 3% SODIUM CHLORIDE 3 G/100ML
50 INJECTION, SOLUTION INTRAVENOUS ONCE
Status: COMPLETED | OUTPATIENT
Start: 2024-03-01 | End: 2024-03-01

## 2024-03-01 RX ORDER — MEGESTROL ACETATE 40 MG/1
40 TABLET ORAL DAILY
COMMUNITY

## 2024-03-01 RX ORDER — DEXTROSE MONOHYDRATE 100 MG/ML
INJECTION, SOLUTION INTRAVENOUS CONTINUOUS PRN
Status: DISCONTINUED | OUTPATIENT
Start: 2024-03-01 | End: 2024-03-07 | Stop reason: HOSPADM

## 2024-03-01 RX ORDER — ACETAZOLAMIDE 500 MG/1
1000 CAPSULE, EXTENDED RELEASE ORAL 3 TIMES DAILY
Status: ON HOLD | COMMUNITY
End: 2024-03-06 | Stop reason: HOSPADM

## 2024-03-01 RX ORDER — ACETAMINOPHEN 650 MG/1
650 SUPPOSITORY RECTAL EVERY 6 HOURS PRN
Status: DISCONTINUED | OUTPATIENT
Start: 2024-03-01 | End: 2024-03-07 | Stop reason: HOSPADM

## 2024-03-01 RX ORDER — MAGNESIUM SULFATE IN WATER 40 MG/ML
2000 INJECTION, SOLUTION INTRAVENOUS PRN
Status: DISCONTINUED | OUTPATIENT
Start: 2024-03-01 | End: 2024-03-07 | Stop reason: HOSPADM

## 2024-03-01 RX ORDER — POLYETHYLENE GLYCOL 3350 17 G/17G
17 POWDER, FOR SOLUTION ORAL DAILY PRN
Status: DISCONTINUED | OUTPATIENT
Start: 2024-03-01 | End: 2024-03-07 | Stop reason: HOSPADM

## 2024-03-01 RX ORDER — ONDANSETRON 4 MG/1
4 TABLET, ORALLY DISINTEGRATING ORAL EVERY 8 HOURS PRN
Status: DISCONTINUED | OUTPATIENT
Start: 2024-03-01 | End: 2024-03-07 | Stop reason: HOSPADM

## 2024-03-01 RX ORDER — 0.9 % SODIUM CHLORIDE 0.9 %
1000 INTRAVENOUS SOLUTION INTRAVENOUS ONCE
Status: COMPLETED | OUTPATIENT
Start: 2024-03-01 | End: 2024-03-01

## 2024-03-01 RX ORDER — POTASSIUM CHLORIDE 29.8 MG/ML
20 INJECTION INTRAVENOUS PRN
Status: DISCONTINUED | OUTPATIENT
Start: 2024-03-01 | End: 2024-03-07 | Stop reason: HOSPADM

## 2024-03-01 RX ORDER — ACETAMINOPHEN 325 MG/1
650 TABLET ORAL EVERY 6 HOURS PRN
Status: DISCONTINUED | OUTPATIENT
Start: 2024-03-01 | End: 2024-03-07 | Stop reason: HOSPADM

## 2024-03-01 RX ORDER — OXYCODONE HCL 5 MG/5 ML
2.5 SOLUTION, ORAL ORAL EVERY 4 HOURS PRN
Status: ON HOLD | COMMUNITY
End: 2024-03-06

## 2024-03-01 RX ORDER — SODIUM CHLORIDE 9 MG/ML
INJECTION, SOLUTION INTRAVENOUS CONTINUOUS
Status: DISCONTINUED | OUTPATIENT
Start: 2024-03-01 | End: 2024-03-02

## 2024-03-01 RX ORDER — LORAZEPAM 1 MG/1
1 TABLET ORAL 2 TIMES DAILY PRN
COMMUNITY

## 2024-03-01 RX ORDER — SODIUM CHLORIDE 0.9 % (FLUSH) 0.9 %
5-40 SYRINGE (ML) INJECTION EVERY 12 HOURS SCHEDULED
Status: DISCONTINUED | OUTPATIENT
Start: 2024-03-01 | End: 2024-03-07 | Stop reason: HOSPADM

## 2024-03-01 RX ORDER — SODIUM CHLORIDE 9 MG/ML
INJECTION, SOLUTION INTRAVENOUS PRN
Status: DISCONTINUED | OUTPATIENT
Start: 2024-03-01 | End: 2024-03-07 | Stop reason: HOSPADM

## 2024-03-01 RX ORDER — ASCORBIC ACID 500 MG
500 TABLET ORAL DAILY
COMMUNITY

## 2024-03-01 RX ORDER — POTASSIUM CHLORIDE 7.45 MG/ML
10 INJECTION INTRAVENOUS PRN
Status: DISCONTINUED | OUTPATIENT
Start: 2024-03-01 | End: 2024-03-07 | Stop reason: HOSPADM

## 2024-03-01 RX ORDER — ENOXAPARIN SODIUM 300 MG/3ML
INJECTION INTRAVENOUS; SUBCUTANEOUS 2 TIMES DAILY
COMMUNITY

## 2024-03-01 RX ORDER — FERROUS SULFATE 325(65) MG
325 TABLET ORAL
COMMUNITY

## 2024-03-01 RX ORDER — LANOLIN ALCOHOL/MO/W.PET/CERES
3 CREAM (GRAM) TOPICAL DAILY
COMMUNITY

## 2024-03-01 RX ORDER — ONDANSETRON 2 MG/ML
4 INJECTION INTRAMUSCULAR; INTRAVENOUS EVERY 6 HOURS PRN
Status: DISCONTINUED | OUTPATIENT
Start: 2024-03-01 | End: 2024-03-07 | Stop reason: HOSPADM

## 2024-03-01 RX ORDER — ENOXAPARIN SODIUM 100 MG/ML
40 INJECTION SUBCUTANEOUS DAILY
Status: DISCONTINUED | OUTPATIENT
Start: 2024-03-01 | End: 2024-03-02

## 2024-03-01 RX ORDER — DEXTROSE MONOHYDRATE 100 MG/ML
INJECTION, SOLUTION INTRAVENOUS CONTINUOUS PRN
Status: CANCELLED | OUTPATIENT
Start: 2024-03-01

## 2024-03-01 RX ORDER — METHOCARBAMOL 500 MG/1
500 TABLET, FILM COATED ORAL 4 TIMES DAILY
COMMUNITY

## 2024-03-01 RX ORDER — OXYBUTYNIN CHLORIDE 5 MG/1
5 TABLET, EXTENDED RELEASE ORAL DAILY
COMMUNITY

## 2024-03-01 RX ADMIN — SODIUM CHLORIDE: 9 INJECTION, SOLUTION INTRAVENOUS at 19:05

## 2024-03-01 RX ADMIN — WATER 1000 MG: 1 INJECTION INTRAMUSCULAR; INTRAVENOUS; SUBCUTANEOUS at 19:07

## 2024-03-01 RX ADMIN — SODIUM CHLORIDE 1000 ML: 9 INJECTION, SOLUTION INTRAVENOUS at 16:19

## 2024-03-01 RX ADMIN — DEXTROSE MONOHYDRATE 250 ML: 100 INJECTION, SOLUTION INTRAVENOUS at 22:14

## 2024-03-01 RX ADMIN — ENOXAPARIN SODIUM 40 MG: 100 INJECTION SUBCUTANEOUS at 19:07

## 2024-03-01 RX ADMIN — SODIUM CHLORIDE 50 ML: 3 INJECTION, SOLUTION INTRAVENOUS at 19:26

## 2024-03-01 ASSESSMENT — PAIN SCALES - GENERAL
PAINLEVEL_OUTOF10: 0
PAINLEVEL_OUTOF10: 0

## 2024-03-01 ASSESSMENT — PAIN - FUNCTIONAL ASSESSMENT
PAIN_FUNCTIONAL_ASSESSMENT: ADULT NONVERBAL PAIN SCALE (NPVS)
PAIN_FUNCTIONAL_ASSESSMENT: ADULT NONVERBAL PAIN SCALE (NPVS)

## 2024-03-01 NOTE — ED TRIAGE NOTES
EMS reports being called out to bring patient to hospital for evaluation due to sodium level of 115 on routine labs. Patient was admitted to [facility] 2/23 for left femur fracture. Patient is slow to respond. Mouth extensively dry. BGL- 96. Patient keeps crying out. \"I'm thirsty\".

## 2024-03-02 LAB
ALBUMIN SERPL-MCNC: 2.2 G/DL (ref 3.5–5)
ALBUMIN/GLOB SERPL: 1 (ref 1.1–2.2)
ALP SERPL-CCNC: 80 U/L (ref 45–117)
ALT SERPL-CCNC: 21 U/L (ref 12–78)
ANION GAP SERPL CALC-SCNC: 4 MMOL/L (ref 5–15)
ANION GAP SERPL CALC-SCNC: 4 MMOL/L (ref 5–15)
ANION GAP SERPL CALC-SCNC: 5 MMOL/L (ref 5–15)
ANION GAP SERPL CALC-SCNC: 8 MMOL/L (ref 5–15)
ANION GAP SERPL CALC-SCNC: 9 MMOL/L (ref 5–15)
AST SERPL-CCNC: 19 U/L (ref 15–37)
BASOPHILS # BLD: 0 K/UL (ref 0–0.1)
BASOPHILS NFR BLD: 0 % (ref 0–1)
BILIRUB DIRECT SERPL-MCNC: 0.3 MG/DL (ref 0–0.2)
BILIRUB SERPL-MCNC: 0.7 MG/DL (ref 0.2–1)
BUN SERPL-MCNC: 10 MG/DL (ref 6–20)
BUN SERPL-MCNC: 10 MG/DL (ref 6–20)
BUN SERPL-MCNC: 11 MG/DL (ref 6–20)
BUN/CREAT SERPL: 17 (ref 12–20)
BUN/CREAT SERPL: 19 (ref 12–20)
BUN/CREAT SERPL: 26 (ref 12–20)
BUN/CREAT SERPL: 30 (ref 12–20)
BUN/CREAT SERPL: 34 (ref 12–20)
CA-I BLD-SCNC: 1.17 MMOL/L (ref 1.13–1.32)
CALCIUM SERPL-MCNC: 7.4 MG/DL (ref 8.5–10.1)
CALCIUM SERPL-MCNC: 7.7 MG/DL (ref 8.5–10.1)
CALCIUM SERPL-MCNC: 8 MG/DL (ref 8.5–10.1)
CALCIUM SERPL-MCNC: 8 MG/DL (ref 8.5–10.1)
CALCIUM SERPL-MCNC: 8.2 MG/DL (ref 8.5–10.1)
CHLORIDE SERPL-SCNC: 94 MMOL/L (ref 97–108)
CHLORIDE SERPL-SCNC: 94 MMOL/L (ref 97–108)
CHLORIDE SERPL-SCNC: 95 MMOL/L (ref 97–108)
CHLORIDE SERPL-SCNC: 96 MMOL/L (ref 97–108)
CHLORIDE SERPL-SCNC: 98 MMOL/L (ref 97–108)
CO2 SERPL-SCNC: 20 MMOL/L (ref 21–32)
CO2 SERPL-SCNC: 22 MMOL/L (ref 21–32)
CO2 SERPL-SCNC: 24 MMOL/L (ref 21–32)
CORTIS SERPL-MCNC: 23.4 UG/DL
CREAT SERPL-MCNC: 0.32 MG/DL (ref 0.55–1.02)
CREAT SERPL-MCNC: 0.37 MG/DL (ref 0.55–1.02)
CREAT SERPL-MCNC: 0.39 MG/DL (ref 0.55–1.02)
CREAT SERPL-MCNC: 0.52 MG/DL (ref 0.55–1.02)
CREAT SERPL-MCNC: 0.63 MG/DL (ref 0.55–1.02)
DIFFERENTIAL METHOD BLD: ABNORMAL
EOSINOPHIL # BLD: 0 K/UL (ref 0–0.4)
EOSINOPHIL NFR BLD: 0 % (ref 0–7)
ERYTHROCYTE [DISTWIDTH] IN BLOOD BY AUTOMATED COUNT: 15.4 % (ref 11.5–14.5)
GLOBULIN SER CALC-MCNC: 2.3 G/DL (ref 2–4)
GLUCOSE BLD STRIP.AUTO-MCNC: 129 MG/DL (ref 65–117)
GLUCOSE SERPL-MCNC: 105 MG/DL (ref 65–100)
GLUCOSE SERPL-MCNC: 114 MG/DL (ref 65–100)
GLUCOSE SERPL-MCNC: 116 MG/DL (ref 65–100)
GLUCOSE SERPL-MCNC: 89 MG/DL (ref 65–100)
GLUCOSE SERPL-MCNC: 90 MG/DL (ref 65–100)
HCT VFR BLD AUTO: 23.7 % (ref 35–47)
HGB BLD-MCNC: 8.3 G/DL (ref 11.5–16)
IMM GRANULOCYTES # BLD AUTO: 0.1 K/UL (ref 0–0.04)
IMM GRANULOCYTES NFR BLD AUTO: 1 % (ref 0–0.5)
LYMPHOCYTES # BLD: 0.2 K/UL (ref 0.8–3.5)
LYMPHOCYTES NFR BLD: 3 % (ref 12–49)
MAGNESIUM SERPL-MCNC: 1.9 MG/DL (ref 1.6–2.4)
MCH RBC QN AUTO: 31.1 PG (ref 26–34)
MCHC RBC AUTO-ENTMCNC: 35 G/DL (ref 30–36.5)
MCV RBC AUTO: 88.8 FL (ref 80–99)
MONOCYTES # BLD: 0.7 K/UL (ref 0–1)
MONOCYTES NFR BLD: 9 % (ref 5–13)
NEUTS SEG # BLD: 6.6 K/UL (ref 1.8–8)
NEUTS SEG NFR BLD: 87 % (ref 32–75)
NRBC # BLD: 0 K/UL (ref 0–0.01)
NRBC BLD-RTO: 0 PER 100 WBC
OSMOLALITY SERPL: 251 MOSM/KG H2O
OSMOLALITY UR: 395 MOSM/KG H2O
PHOSPHATE SERPL-MCNC: 2.1 MG/DL (ref 2.6–4.7)
PLATELET # BLD AUTO: 311 K/UL (ref 150–400)
PMV BLD AUTO: 8.9 FL (ref 8.9–12.9)
POTASSIUM SERPL-SCNC: 3.3 MMOL/L (ref 3.5–5.1)
POTASSIUM SERPL-SCNC: 3.6 MMOL/L (ref 3.5–5.1)
POTASSIUM SERPL-SCNC: 3.7 MMOL/L (ref 3.5–5.1)
POTASSIUM SERPL-SCNC: 4 MMOL/L (ref 3.5–5.1)
POTASSIUM SERPL-SCNC: 4.1 MMOL/L (ref 3.5–5.1)
PROT SERPL-MCNC: 4.5 G/DL (ref 6.4–8.2)
RBC # BLD AUTO: 2.67 M/UL (ref 3.8–5.2)
RBC MORPH BLD: ABNORMAL
SERVICE CMNT-IMP: ABNORMAL
SODIUM SERPL-SCNC: 121 MMOL/L (ref 136–145)
SODIUM SERPL-SCNC: 122 MMOL/L (ref 136–145)
SODIUM SERPL-SCNC: 122 MMOL/L (ref 136–145)
SODIUM SERPL-SCNC: 125 MMOL/L (ref 136–145)
SODIUM SERPL-SCNC: 127 MMOL/L (ref 136–145)
SODIUM UR-SCNC: 89 MMOL/L
T3FREE SERPL-MCNC: 1.1 PG/ML (ref 2.2–4)
T4 FREE SERPL-MCNC: 1.7 NG/DL (ref 0.8–1.5)
TSH SERPL DL<=0.05 MIU/L-ACNC: 0.54 UIU/ML (ref 0.36–3.74)
URATE SERPL-MCNC: 2.4 MG/DL (ref 2.6–6)
WBC # BLD AUTO: 7.6 K/UL (ref 3.6–11)

## 2024-03-02 PROCEDURE — 6360000002 HC RX W HCPCS: Performed by: NURSE PRACTITIONER

## 2024-03-02 PROCEDURE — 84300 ASSAY OF URINE SODIUM: CPT

## 2024-03-02 PROCEDURE — 84100 ASSAY OF PHOSPHORUS: CPT

## 2024-03-02 PROCEDURE — 83735 ASSAY OF MAGNESIUM: CPT

## 2024-03-02 PROCEDURE — 2000000000 HC ICU R&B

## 2024-03-02 PROCEDURE — 84550 ASSAY OF BLOOD/URIC ACID: CPT

## 2024-03-02 PROCEDURE — 82533 TOTAL CORTISOL: CPT

## 2024-03-02 PROCEDURE — 6370000000 HC RX 637 (ALT 250 FOR IP): Performed by: NURSE PRACTITIONER

## 2024-03-02 PROCEDURE — 83930 ASSAY OF BLOOD OSMOLALITY: CPT

## 2024-03-02 PROCEDURE — 6370000000 HC RX 637 (ALT 250 FOR IP): Performed by: INTERNAL MEDICINE

## 2024-03-02 PROCEDURE — 82962 GLUCOSE BLOOD TEST: CPT

## 2024-03-02 PROCEDURE — 36415 COLL VENOUS BLD VENIPUNCTURE: CPT

## 2024-03-02 PROCEDURE — 84443 ASSAY THYROID STIM HORMONE: CPT

## 2024-03-02 PROCEDURE — 85025 COMPLETE CBC W/AUTO DIFF WBC: CPT

## 2024-03-02 PROCEDURE — 83935 ASSAY OF URINE OSMOLALITY: CPT

## 2024-03-02 PROCEDURE — 80048 BASIC METABOLIC PNL TOTAL CA: CPT

## 2024-03-02 PROCEDURE — 84481 FREE ASSAY (FT-3): CPT

## 2024-03-02 PROCEDURE — 82330 ASSAY OF CALCIUM: CPT

## 2024-03-02 PROCEDURE — 80076 HEPATIC FUNCTION PANEL: CPT

## 2024-03-02 PROCEDURE — 2580000003 HC RX 258: Performed by: INTERNAL MEDICINE

## 2024-03-02 PROCEDURE — 2580000003 HC RX 258: Performed by: NURSE PRACTITIONER

## 2024-03-02 PROCEDURE — 84439 ASSAY OF FREE THYROXINE: CPT

## 2024-03-02 PROCEDURE — 6360000002 HC RX W HCPCS: Performed by: INTERNAL MEDICINE

## 2024-03-02 RX ORDER — OXYBUTYNIN CHLORIDE 5 MG/1
5 TABLET, EXTENDED RELEASE ORAL DAILY
Status: DISCONTINUED | OUTPATIENT
Start: 2024-03-02 | End: 2024-03-07 | Stop reason: HOSPADM

## 2024-03-02 RX ORDER — LANOLIN ALCOHOL/MO/W.PET/CERES
6 CREAM (GRAM) TOPICAL NIGHTLY PRN
Status: DISCONTINUED | OUTPATIENT
Start: 2024-03-02 | End: 2024-03-07 | Stop reason: HOSPADM

## 2024-03-02 RX ORDER — 0.9 % SODIUM CHLORIDE 0.9 %
500 INTRAVENOUS SOLUTION INTRAVENOUS ONCE
Status: COMPLETED | OUTPATIENT
Start: 2024-03-02 | End: 2024-03-02

## 2024-03-02 RX ORDER — PANTOPRAZOLE SODIUM 40 MG/1
40 TABLET, DELAYED RELEASE ORAL
Status: DISCONTINUED | OUTPATIENT
Start: 2024-03-02 | End: 2024-03-03

## 2024-03-02 RX ORDER — BIOTIN 5 MG
2.5 TABLET ORAL DAILY
Status: DISCONTINUED | OUTPATIENT
Start: 2024-03-02 | End: 2024-03-07 | Stop reason: HOSPADM

## 2024-03-02 RX ORDER — DESMOPRESSIN ACETATE 4 UG/ML
2 INJECTION, SOLUTION INTRAVENOUS; SUBCUTANEOUS ONCE
Status: COMPLETED | OUTPATIENT
Start: 2024-03-02 | End: 2024-03-02

## 2024-03-02 RX ORDER — ENOXAPARIN SODIUM 100 MG/ML
40 INJECTION SUBCUTANEOUS DAILY
Status: DISCONTINUED | OUTPATIENT
Start: 2024-03-02 | End: 2024-03-04

## 2024-03-02 RX ORDER — LATANOPROST 50 UG/ML
1 SOLUTION/ DROPS OPHTHALMIC NIGHTLY
Status: DISCONTINUED | OUTPATIENT
Start: 2024-03-02 | End: 2024-03-07 | Stop reason: HOSPADM

## 2024-03-02 RX ORDER — DEXTROSE MONOHYDRATE 50 MG/ML
INJECTION, SOLUTION INTRAVENOUS CONTINUOUS
Status: DISCONTINUED | OUTPATIENT
Start: 2024-03-02 | End: 2024-03-02

## 2024-03-02 RX ORDER — POTASSIUM CHLORIDE 750 MG/1
40 TABLET, FILM COATED, EXTENDED RELEASE ORAL ONCE
Status: DISCONTINUED | OUTPATIENT
Start: 2024-03-02 | End: 2024-03-02

## 2024-03-02 RX ADMIN — POTASSIUM BICARBONATE 40 MEQ: 782 TABLET, EFFERVESCENT ORAL at 17:18

## 2024-03-02 RX ADMIN — DEXTROSE MONOHYDRATE: 50 INJECTION, SOLUTION INTRAVENOUS at 02:27

## 2024-03-02 RX ADMIN — DESMOPRESSIN ACETATE 2 MCG: 4 SOLUTION INTRAVENOUS at 02:27

## 2024-03-02 RX ADMIN — Medication 6 MG: at 20:30

## 2024-03-02 RX ADMIN — LATANOPROST 1 DROP: 50 SOLUTION OPHTHALMIC at 19:43

## 2024-03-02 RX ADMIN — ACETAMINOPHEN 650 MG: 325 TABLET ORAL at 10:35

## 2024-03-02 RX ADMIN — ACETAMINOPHEN 650 MG: 325 TABLET ORAL at 20:30

## 2024-03-02 RX ADMIN — WATER 1000 MG: 1 INJECTION INTRAMUSCULAR; INTRAVENOUS; SUBCUTANEOUS at 19:04

## 2024-03-02 RX ADMIN — ENOXAPARIN SODIUM 40 MG: 100 INJECTION SUBCUTANEOUS at 19:05

## 2024-03-02 RX ADMIN — SODIUM CHLORIDE, PRESERVATIVE FREE 20 ML: 5 INJECTION INTRAVENOUS at 19:44

## 2024-03-02 RX ADMIN — Medication 2.5 MG: at 08:43

## 2024-03-02 RX ADMIN — PANTOPRAZOLE SODIUM 40 MG: 40 TABLET, DELAYED RELEASE ORAL at 08:43

## 2024-03-02 RX ADMIN — SODIUM CHLORIDE 500 ML: 9 INJECTION, SOLUTION INTRAVENOUS at 14:28

## 2024-03-02 RX ADMIN — SODIUM CHLORIDE, PRESERVATIVE FREE 10 ML: 5 INJECTION INTRAVENOUS at 08:49

## 2024-03-02 RX ADMIN — POTASSIUM BICARBONATE 40 MEQ: 782 TABLET, EFFERVESCENT ORAL at 02:27

## 2024-03-02 RX ADMIN — OXYBUTYNIN CHLORIDE 5 MG: 5 TABLET, EXTENDED RELEASE ORAL at 08:43

## 2024-03-02 ASSESSMENT — PAIN DESCRIPTION - FREQUENCY: FREQUENCY: INTERMITTENT

## 2024-03-02 ASSESSMENT — PAIN SCALES - GENERAL: PAINLEVEL_OUTOF10: 2

## 2024-03-02 ASSESSMENT — PAIN DESCRIPTION - ONSET: ONSET: GRADUAL

## 2024-03-02 ASSESSMENT — PAIN DESCRIPTION - ORIENTATION: ORIENTATION: LEFT

## 2024-03-02 ASSESSMENT — PAIN DESCRIPTION - LOCATION: LOCATION: HIP

## 2024-03-02 ASSESSMENT — PAIN DESCRIPTION - DESCRIPTORS: DESCRIPTORS: ACHING

## 2024-03-02 ASSESSMENT — PAIN DESCRIPTION - PAIN TYPE: TYPE: SURGICAL PAIN

## 2024-03-02 NOTE — PROGRESS NOTES
PLACE OF SERVICE:  [facility] [place] [address] [place] [address]    SKILLED VISIT    3/1/24      Chief Complaint:    Left hip fracture  Hyponatremia      HPI : Lesly Ybarra is a 88 y.o. female history of hypertension anxiety bladder CA who fell at home while trying to lift her  who had fallen to the floor.  Patient landed on the floor hit her left hip.  She was brought to the emergency room and imaging confirmed a left hip intertrochanteric fracture.  She also had urinary retention Garcias was placed with 500 cc of urine collected in the urine bag.  Patient was admitted to the trauma team and seen by orthopedic underwent repair of left hip fracture with IM nail.  Postoperative course was complicated by acute blood loss anemia required 1 units of packed cells.  Patient was found on the floor last night.  No apparent injuries reported.  When I arrived this morning she was lying in the bed in no acute distress sleeping.  Nurses tell me she did not sleep last night.  Daughter is present in exam yesterday she was noticed to be a little confused.  She has not received any pain medications over the last 2 days.  Her  just .  Urine sample has been collected will start empiric antibiotics.  Cussed at detail with the patient's daughter.  PMH:   Hypertension  Bladder cancer  Anxiety depression  Osteoporosis    ROS:   The following system review was negative:  Constitutional; Respiratory; Cardiovascular; Genitourinary; Gastrointestinal; Psychiatric; Ear-Nose-Throat; Musculoskeletal; Neurologic; Endocrine; Hematologic; Skin; Eyes; denies any    Medications: Reviewed in EMR and assessment and plan    Social History:    Family History:  non contributory     Code Status:Full code    Allergies: Reviewed  Allergies   Allergen Reactions    Amlodipine Cough     Reaction Type: Side Effect    Diphenhydramine Other (See Comments)     hyperactive    Lisinopril Cough       Medications:

## 2024-03-02 NOTE — ED PROVIDER NOTES
[facility] EMERGENCY DEP  EMERGENCY DEPARTMENT ENCOUNTER      Pt Name: Lesly Ybarra  MRN: [id number]  Birthdate 1935  Date of evaluation: 3/1/2024  Provider: Akbar Shannon MD    CHIEF COMPLAINT       Chief Complaint   Patient presents with    Abnormal Lab         HISTORY OF PRESENT ILLNESS   (Location/Symptom, Timing/Onset, Context/Setting, Quality, Duration, Modifying Factors, Severity)  Note limiting factors.   Patient is an 88-year-old female present emergency department for altered mental status, confusion, somnolence.  Patient recently underwent orthopedic surgery for a left intertrochanteric nail procedures performed and Laura doctors after surgery per the daughter she would require admission to the ICU for hyponatremia.  Patient's daughter who is bedside states that yesterday she was alert active was able to transition from her wheelchair to her bed today when she went to check on her she was altered somnolent staff had indicated that she potentially had a fall overnight and did not sleep well.  They did point-of-care EPOCH testing with a sodium of 115 patient was sent to the emergency department for further evaluation.            Review of External Medical Records:     Nursing Notes were reviewed.    REVIEW OF SYSTEMS    (2-9 systems for level 4, 10 or more for level 5)     Review of Systems    Except as noted above the remainder of the review of systems was reviewed and negative.       PAST MEDICAL HISTORY     Past Medical History:   Diagnosis Date    Cancer (HCC) 1994    lymphoma    Hypertension          SURGICAL HISTORY       Past Surgical History:   Procedure Laterality Date    HEENT      tonsillectomy    ORTHOPEDIC SURGERY  4/2014    lumbar fracture         CURRENT MEDICATIONS       Previous Medications    ACETAZOLAMIDE (DIAMOX) 500 MG EXTENDED RELEASE CAPSULE    Take 2 capsules by mouth 3 times daily    ASCORBIC ACID (VITAMIN C) 500 MG TABLET    Take 1 tablet by mouth daily    BIMATOPROST

## 2024-03-03 LAB
ANION GAP SERPL CALC-SCNC: 3 MMOL/L (ref 5–15)
ANION GAP SERPL CALC-SCNC: 5 MMOL/L (ref 5–15)
ANION GAP SERPL CALC-SCNC: 6 MMOL/L (ref 5–15)
ANION GAP SERPL CALC-SCNC: 8 MMOL/L (ref 5–15)
BUN SERPL-MCNC: 17 MG/DL (ref 6–20)
BUN SERPL-MCNC: 19 MG/DL (ref 6–20)
BUN SERPL-MCNC: 9 MG/DL (ref 6–20)
BUN SERPL-MCNC: 9 MG/DL (ref 6–20)
BUN/CREAT SERPL: 25 (ref 12–20)
BUN/CREAT SERPL: 29 (ref 12–20)
BUN/CREAT SERPL: 36 (ref 12–20)
BUN/CREAT SERPL: 46 (ref 12–20)
CALCIUM SERPL-MCNC: 6.8 MG/DL (ref 8.5–10.1)
CALCIUM SERPL-MCNC: 7.1 MG/DL (ref 8.5–10.1)
CALCIUM SERPL-MCNC: 7.8 MG/DL (ref 8.5–10.1)
CALCIUM SERPL-MCNC: 9 MG/DL (ref 8.5–10.1)
CHLORIDE SERPL-SCNC: 100 MMOL/L (ref 97–108)
CHLORIDE SERPL-SCNC: 103 MMOL/L (ref 97–108)
CHLORIDE SERPL-SCNC: 110 MMOL/L (ref 97–108)
CHLORIDE SERPL-SCNC: 93 MMOL/L (ref 97–108)
CO2 SERPL-SCNC: 19 MMOL/L (ref 21–32)
CO2 SERPL-SCNC: 22 MMOL/L (ref 21–32)
CO2 SERPL-SCNC: 23 MMOL/L (ref 21–32)
CO2 SERPL-SCNC: 23 MMOL/L (ref 21–32)
CREAT SERPL-MCNC: 0.25 MG/DL (ref 0.55–1.02)
CREAT SERPL-MCNC: 0.31 MG/DL (ref 0.55–1.02)
CREAT SERPL-MCNC: 0.37 MG/DL (ref 0.55–1.02)
CREAT SERPL-MCNC: 0.77 MG/DL (ref 0.55–1.02)
ERYTHROCYTE [DISTWIDTH] IN BLOOD BY AUTOMATED COUNT: 15.4 % (ref 11.5–14.5)
ERYTHROCYTE [DISTWIDTH] IN BLOOD BY AUTOMATED COUNT: 15.6 % (ref 11.5–14.5)
GLUCOSE SERPL-MCNC: 105 MG/DL (ref 65–100)
GLUCOSE SERPL-MCNC: 113 MG/DL (ref 65–100)
GLUCOSE SERPL-MCNC: 70 MG/DL (ref 65–100)
GLUCOSE SERPL-MCNC: 76 MG/DL (ref 65–100)
HCT VFR BLD AUTO: 19.8 % (ref 35–47)
HCT VFR BLD AUTO: 32.3 % (ref 35–47)
HGB BLD-MCNC: 11.1 G/DL (ref 11.5–16)
HGB BLD-MCNC: 7 G/DL (ref 11.5–16)
HISTORY CHECK: NORMAL
IRON SATN MFR SERPL: 25 % (ref 20–50)
IRON SERPL-MCNC: 49 UG/DL (ref 35–150)
MAGNESIUM SERPL-MCNC: 1.7 MG/DL (ref 1.6–2.4)
MCH RBC QN AUTO: 30.2 PG (ref 26–34)
MCH RBC QN AUTO: 31.5 PG (ref 26–34)
MCHC RBC AUTO-ENTMCNC: 34.4 G/DL (ref 30–36.5)
MCHC RBC AUTO-ENTMCNC: 35.4 G/DL (ref 30–36.5)
MCV RBC AUTO: 88 FL (ref 80–99)
MCV RBC AUTO: 89.2 FL (ref 80–99)
NRBC # BLD: 0 K/UL (ref 0–0.01)
NRBC # BLD: 0 K/UL (ref 0–0.01)
NRBC BLD-RTO: 0 PER 100 WBC
NRBC BLD-RTO: 0 PER 100 WBC
PHOSPHATE SERPL-MCNC: 1.5 MG/DL (ref 2.6–4.7)
PHOSPHATE SERPL-MCNC: 2.8 MG/DL (ref 2.6–4.7)
PLATELET # BLD AUTO: 252 K/UL (ref 150–400)
PLATELET # BLD AUTO: 370 K/UL (ref 150–400)
PMV BLD AUTO: 8.6 FL (ref 8.9–12.9)
PMV BLD AUTO: 9 FL (ref 8.9–12.9)
POTASSIUM SERPL-SCNC: 3.6 MMOL/L (ref 3.5–5.1)
POTASSIUM SERPL-SCNC: 3.6 MMOL/L (ref 3.5–5.1)
POTASSIUM SERPL-SCNC: 4.3 MMOL/L (ref 3.5–5.1)
POTASSIUM SERPL-SCNC: 4.6 MMOL/L (ref 3.5–5.1)
RBC # BLD AUTO: 2.22 M/UL (ref 3.8–5.2)
RBC # BLD AUTO: 3.67 M/UL (ref 3.8–5.2)
SODIUM SERPL-SCNC: 122 MMOL/L (ref 136–145)
SODIUM SERPL-SCNC: 127 MMOL/L (ref 136–145)
SODIUM SERPL-SCNC: 130 MMOL/L (ref 136–145)
SODIUM SERPL-SCNC: 136 MMOL/L (ref 136–145)
TIBC SERPL-MCNC: 197 UG/DL (ref 250–450)
WBC # BLD AUTO: 10 K/UL (ref 3.6–11)
WBC # BLD AUTO: 6.2 K/UL (ref 3.6–11)

## 2024-03-03 PROCEDURE — 84100 ASSAY OF PHOSPHORUS: CPT

## 2024-03-03 PROCEDURE — 83550 IRON BINDING TEST: CPT

## 2024-03-03 PROCEDURE — 85027 COMPLETE CBC AUTOMATED: CPT

## 2024-03-03 PROCEDURE — 97165 OT EVAL LOW COMPLEX 30 MIN: CPT

## 2024-03-03 PROCEDURE — 83735 ASSAY OF MAGNESIUM: CPT

## 2024-03-03 PROCEDURE — 2500000003 HC RX 250 WO HCPCS: Performed by: INTERNAL MEDICINE

## 2024-03-03 PROCEDURE — 86900 BLOOD TYPING SEROLOGIC ABO: CPT

## 2024-03-03 PROCEDURE — 36430 TRANSFUSION BLD/BLD COMPNT: CPT

## 2024-03-03 PROCEDURE — 86923 COMPATIBILITY TEST ELECTRIC: CPT

## 2024-03-03 PROCEDURE — 80048 BASIC METABOLIC PNL TOTAL CA: CPT

## 2024-03-03 PROCEDURE — 97530 THERAPEUTIC ACTIVITIES: CPT

## 2024-03-03 PROCEDURE — P9040 RBC LEUKOREDUCED IRRADIATED: HCPCS

## 2024-03-03 PROCEDURE — 6370000000 HC RX 637 (ALT 250 FOR IP): Performed by: NURSE PRACTITIONER

## 2024-03-03 PROCEDURE — 97535 SELF CARE MNGMENT TRAINING: CPT

## 2024-03-03 PROCEDURE — 97161 PT EVAL LOW COMPLEX 20 MIN: CPT

## 2024-03-03 PROCEDURE — 83540 ASSAY OF IRON: CPT

## 2024-03-03 PROCEDURE — 2060000000 HC ICU INTERMEDIATE R&B

## 2024-03-03 PROCEDURE — 2580000003 HC RX 258: Performed by: INTERNAL MEDICINE

## 2024-03-03 PROCEDURE — 86901 BLOOD TYPING SEROLOGIC RH(D): CPT

## 2024-03-03 PROCEDURE — 6360000002 HC RX W HCPCS: Performed by: INTERNAL MEDICINE

## 2024-03-03 PROCEDURE — 6370000000 HC RX 637 (ALT 250 FOR IP): Performed by: INTERNAL MEDICINE

## 2024-03-03 PROCEDURE — 30243N1 TRANSFUSION OF NONAUTOLOGOUS RED BLOOD CELLS INTO CENTRAL VEIN, PERCUTANEOUS APPROACH: ICD-10-PCS | Performed by: INTERNAL MEDICINE

## 2024-03-03 PROCEDURE — 86850 RBC ANTIBODY SCREEN: CPT

## 2024-03-03 PROCEDURE — P9016 RBC LEUKOCYTES REDUCED: HCPCS

## 2024-03-03 RX ORDER — 3% SODIUM CHLORIDE 3 G/100ML
50 INJECTION, SOLUTION INTRAVENOUS ONCE
Status: COMPLETED | OUTPATIENT
Start: 2024-03-03 | End: 2024-03-03

## 2024-03-03 RX ORDER — MAGNESIUM SULFATE IN WATER 40 MG/ML
2000 INJECTION, SOLUTION INTRAVENOUS ONCE
Status: COMPLETED | OUTPATIENT
Start: 2024-03-03 | End: 2024-03-03

## 2024-03-03 RX ORDER — SODIUM CHLORIDE 9 MG/ML
INJECTION, SOLUTION INTRAVENOUS PRN
Status: DISCONTINUED | OUTPATIENT
Start: 2024-03-03 | End: 2024-03-07 | Stop reason: HOSPADM

## 2024-03-03 RX ORDER — PANTOPRAZOLE SODIUM 40 MG/1
40 TABLET, DELAYED RELEASE ORAL
Status: DISCONTINUED | OUTPATIENT
Start: 2024-03-03 | End: 2024-03-04

## 2024-03-03 RX ORDER — SODIUM CHLORIDE 1 G/1
1 TABLET ORAL
Status: DISCONTINUED | OUTPATIENT
Start: 2024-03-03 | End: 2024-03-07 | Stop reason: HOSPADM

## 2024-03-03 RX ADMIN — POTASSIUM PHOSPHATE, MONOBASIC POTASSIUM PHOSPHATE, DIBASIC 30 MMOL: 224; 236 INJECTION, SOLUTION, CONCENTRATE INTRAVENOUS at 10:44

## 2024-03-03 RX ADMIN — SODIUM CHLORIDE, PRESERVATIVE FREE 10 ML: 5 INJECTION INTRAVENOUS at 08:22

## 2024-03-03 RX ADMIN — SODIUM CHLORIDE, PRESERVATIVE FREE 20 ML: 5 INJECTION INTRAVENOUS at 20:00

## 2024-03-03 RX ADMIN — SODIUM CHLORIDE 1 G: 1 TABLET ORAL at 19:01

## 2024-03-03 RX ADMIN — Medication 6 MG: at 19:32

## 2024-03-03 RX ADMIN — ACETAMINOPHEN 650 MG: 325 TABLET ORAL at 13:46

## 2024-03-03 RX ADMIN — LATANOPROST 1 DROP: 50 SOLUTION OPHTHALMIC at 19:32

## 2024-03-03 RX ADMIN — OXYBUTYNIN CHLORIDE 5 MG: 5 TABLET, EXTENDED RELEASE ORAL at 08:23

## 2024-03-03 RX ADMIN — WATER 1000 MG: 1 INJECTION INTRAMUSCULAR; INTRAVENOUS; SUBCUTANEOUS at 19:01

## 2024-03-03 RX ADMIN — Medication 2.5 MG: at 08:23

## 2024-03-03 RX ADMIN — SODIUM CHLORIDE 50 ML/HR: 3 INJECTION, SOLUTION INTRAVENOUS at 19:52

## 2024-03-03 RX ADMIN — PANTOPRAZOLE SODIUM 40 MG: 40 TABLET, DELAYED RELEASE ORAL at 16:54

## 2024-03-03 RX ADMIN — PANTOPRAZOLE SODIUM 40 MG: 40 TABLET, DELAYED RELEASE ORAL at 08:23

## 2024-03-03 RX ADMIN — MAGNESIUM SULFATE HEPTAHYDRATE 2000 MG: 40 INJECTION, SOLUTION INTRAVENOUS at 10:51

## 2024-03-03 RX ADMIN — SODIUM CHLORIDE: 9 INJECTION, SOLUTION INTRAVENOUS at 10:42

## 2024-03-03 ASSESSMENT — PAIN DESCRIPTION - DESCRIPTORS: DESCRIPTORS: DULL

## 2024-03-03 ASSESSMENT — PAIN SCALES - GENERAL
PAINLEVEL_OUTOF10: 6
PAINLEVEL_OUTOF10: 0

## 2024-03-03 ASSESSMENT — PAIN DESCRIPTION - LOCATION: LOCATION: BACK

## 2024-03-03 NOTE — ACP (ADVANCE CARE PLANNING)
Advance Care Planning     Advance Care Planning Inpatient Note  [facility] Department    Today's Date: 3/3/2024  Unit: [facility] 7S2 INTENSIVE CARE    Received request from rounding.  Upon review of chart and communication with care team, patient's decision making abilities are not in question.. Patient and Child/Children was/were present in the room during visit.    Goals of ACP Conversation:  Discuss advance care planning documents    Health Care Decision Makers:       Primary Decision Maker: Coby Chavez - Child - [phone]  Summary:  Updated Healthcare Decision Maker    Advance Care Planning Documents (Patient Wishes):  None     Assessment:    Ms. Edge stated that she does have an advance directive. Her daughter, Coby is the MPOA and they will provide a copy to be placed in Ms. Edge's medical records. No other needs were articulated.     Interventions:  Requested patient/family to submit existing document for our records: Healthcare Power of /Advance Directive Appointment of Health Care Agent  Living Will/Advance Directive  Discussed and provided education on state decision maker hierarchy    Care Preferences Communicated:   No    Outcomes/Plan:  ACP Discussion: Completed    Electronically signed by KUSHAL Mario on 3/3/2024 at 3:17 PM

## 2024-03-03 NOTE — CONSENT
Informed Consent for Blood Component Transfusion Note    I have discussed with the patient the rationale for blood component transfusion; its benefits in treating or preventing fatigue, organ damage, or death; and its risk which includes mild transfusion reactions, rare risk of blood borne infection, or more serious but rare reactions. I have discussed the alternatives to transfusion, including the risk and consequences of not receiving transfusion. The patient had an opportunity to ask questions and had agreed to proceed with transfusion of blood components.    Electronically signed by Riddhi Melendrez MD on 3/3/24 at 10:26 AM EST

## 2024-03-04 LAB
ABO + RH BLD: NORMAL
ANION GAP SERPL CALC-SCNC: 5 MMOL/L (ref 5–15)
ANION GAP SERPL CALC-SCNC: 5 MMOL/L (ref 5–15)
BACTERIA SPEC CULT: ABNORMAL
BLD PROD TYP BPU: NORMAL
BLOOD BANK DISPENSE STATUS: NORMAL
BLOOD GROUP ANTIBODIES SERPL: NORMAL
BPU ID: NORMAL
BUN SERPL-MCNC: 14 MG/DL (ref 6–20)
BUN SERPL-MCNC: 15 MG/DL (ref 6–20)
BUN/CREAT SERPL: 24 (ref 12–20)
BUN/CREAT SERPL: 48 (ref 12–20)
CALCIUM SERPL-MCNC: 7.1 MG/DL (ref 8.5–10.1)
CALCIUM SERPL-MCNC: 8.5 MG/DL (ref 8.5–10.1)
CC UR VC: ABNORMAL
CHLORIDE SERPL-SCNC: 103 MMOL/L (ref 97–108)
CHLORIDE SERPL-SCNC: 98 MMOL/L (ref 97–108)
CO2 SERPL-SCNC: 20 MMOL/L (ref 21–32)
CO2 SERPL-SCNC: 25 MMOL/L (ref 21–32)
CREAT SERPL-MCNC: 0.31 MG/DL (ref 0.55–1.02)
CREAT SERPL-MCNC: 0.59 MG/DL (ref 0.55–1.02)
CROSSMATCH RESULT: NORMAL
ERYTHROCYTE [DISTWIDTH] IN BLOOD BY AUTOMATED COUNT: 17.2 % (ref 11.5–14.5)
GLUCOSE SERPL-MCNC: 143 MG/DL (ref 65–100)
GLUCOSE SERPL-MCNC: 84 MG/DL (ref 65–100)
HCT VFR BLD AUTO: 28.6 % (ref 35–47)
HGB BLD-MCNC: 10.1 G/DL (ref 11.5–16)
MAGNESIUM SERPL-MCNC: NORMAL MG/DL (ref 1.6–2.4)
MCH RBC QN AUTO: 30.6 PG (ref 26–34)
MCHC RBC AUTO-ENTMCNC: 35.3 G/DL (ref 30–36.5)
MCV RBC AUTO: 86.7 FL (ref 80–99)
NRBC # BLD: 0 K/UL (ref 0–0.01)
NRBC BLD-RTO: 0 PER 100 WBC
PHOSPHATE SERPL-MCNC: 3 MG/DL (ref 2.6–4.7)
PLATELET # BLD AUTO: 388 K/UL (ref 150–400)
PMV BLD AUTO: 10.1 FL (ref 8.9–12.9)
POTASSIUM SERPL-SCNC: 4.1 MMOL/L (ref 3.5–5.1)
POTASSIUM SERPL-SCNC: ABNORMAL MMOL/L (ref 3.5–5.1)
RBC # BLD AUTO: 3.3 M/UL (ref 3.8–5.2)
SERVICE CMNT-IMP: ABNORMAL
SODIUM SERPL-SCNC: 128 MMOL/L (ref 136–145)
SODIUM SERPL-SCNC: 128 MMOL/L (ref 136–145)
SPECIMEN EXP DATE BLD: NORMAL
UNIT DIVISION: 0
WBC # BLD AUTO: 8.2 K/UL (ref 3.6–11)

## 2024-03-04 PROCEDURE — 97535 SELF CARE MNGMENT TRAINING: CPT

## 2024-03-04 PROCEDURE — 6370000000 HC RX 637 (ALT 250 FOR IP): Performed by: NURSE PRACTITIONER

## 2024-03-04 PROCEDURE — 6370000000 HC RX 637 (ALT 250 FOR IP): Performed by: INTERNAL MEDICINE

## 2024-03-04 PROCEDURE — 97530 THERAPEUTIC ACTIVITIES: CPT

## 2024-03-04 PROCEDURE — 80048 BASIC METABOLIC PNL TOTAL CA: CPT

## 2024-03-04 PROCEDURE — 84100 ASSAY OF PHOSPHORUS: CPT

## 2024-03-04 PROCEDURE — 6360000002 HC RX W HCPCS: Performed by: INTERNAL MEDICINE

## 2024-03-04 PROCEDURE — 85027 COMPLETE CBC AUTOMATED: CPT

## 2024-03-04 PROCEDURE — 2060000000 HC ICU INTERMEDIATE R&B

## 2024-03-04 PROCEDURE — 36415 COLL VENOUS BLD VENIPUNCTURE: CPT

## 2024-03-04 PROCEDURE — 2580000003 HC RX 258: Performed by: INTERNAL MEDICINE

## 2024-03-04 PROCEDURE — 97116 GAIT TRAINING THERAPY: CPT

## 2024-03-04 PROCEDURE — 83735 ASSAY OF MAGNESIUM: CPT

## 2024-03-04 PROCEDURE — 92610 EVALUATE SWALLOWING FUNCTION: CPT

## 2024-03-04 RX ORDER — ENOXAPARIN SODIUM 100 MG/ML
40 INJECTION SUBCUTANEOUS EVERY 24 HOURS
Status: DISCONTINUED | OUTPATIENT
Start: 2024-03-04 | End: 2024-03-07 | Stop reason: HOSPADM

## 2024-03-04 RX ORDER — PANTOPRAZOLE SODIUM 40 MG/1
40 TABLET, DELAYED RELEASE ORAL DAILY
Status: DISCONTINUED | OUTPATIENT
Start: 2024-03-05 | End: 2024-03-07 | Stop reason: HOSPADM

## 2024-03-04 RX ADMIN — Medication 6 MG: at 19:40

## 2024-03-04 RX ADMIN — PANTOPRAZOLE SODIUM 40 MG: 40 TABLET, DELAYED RELEASE ORAL at 08:21

## 2024-03-04 RX ADMIN — SODIUM CHLORIDE, PRESERVATIVE FREE 10 ML: 5 INJECTION INTRAVENOUS at 19:54

## 2024-03-04 RX ADMIN — LATANOPROST 1 DROP: 50 SOLUTION OPHTHALMIC at 19:40

## 2024-03-04 RX ADMIN — Medication 2.5 MG: at 08:20

## 2024-03-04 RX ADMIN — ENOXAPARIN SODIUM 40 MG: 100 INJECTION SUBCUTANEOUS at 16:40

## 2024-03-04 RX ADMIN — SODIUM CHLORIDE 1 G: 1 TABLET ORAL at 08:21

## 2024-03-04 RX ADMIN — OXYBUTYNIN CHLORIDE 5 MG: 5 TABLET, EXTENDED RELEASE ORAL at 08:21

## 2024-03-04 RX ADMIN — SODIUM CHLORIDE, PRESERVATIVE FREE 10 ML: 5 INJECTION INTRAVENOUS at 08:32

## 2024-03-04 RX ADMIN — SODIUM CHLORIDE 1 G: 1 TABLET ORAL at 16:40

## 2024-03-04 RX ADMIN — WATER 1000 MG: 1 INJECTION INTRAMUSCULAR; INTRAVENOUS; SUBCUTANEOUS at 19:40

## 2024-03-04 RX ADMIN — SODIUM CHLORIDE 1 G: 1 TABLET ORAL at 12:35

## 2024-03-04 NOTE — H&P
ICU Transfer/Accept Summary     This patient is being transferred OUT OF THE ICU  DATE OF TRANSFER: 3/4/2024       PATIENT ID: Lesly Ybarra  MRN: [id number]   YOB: 1935    PRIMARY CARE PROVIDER: Tabby Packer MD   DATE OF ADMISSION: 3/1/2024  2:58 PM    ATTENDING PHYSICIAN: Sawyer Umaña MD  CONSULTATIONS:   IP CONSULT TO NEPHROLOGY  IP CONSULT TO PALLIATIVE CARE  IP CONSULT TO PALLIATIVE CARE    PROCEDURES/SURGERIES:   * No surgery found *    REASON FOR ADMISSION: Hyponatremia     HOSPITAL PROBLEM LIST:  Patient Active Problem List   Diagnosis    Compression fracture of L2 (HCC)    Malignant neoplasm of left lung (HCC)    Malignant neoplasm of urinary bladder (HCC)    Closed fracture of left hip (HCC)    Primary hypertension    Hyponatremia    Anxiety and depression    Anemia    Bladder spasm             Brief HPI and Hospital Course:    \"Lesly Ybarra is a 87 y/o female  initially presented to the ED from SNF for AMS and abnormal labs, sNA 115. Daughter went to check on patient at SNF today and she had significant decline in mental status and mobility compared to yesterday when she was alert, active, and able to transition from w/c to bed. Possible unwitnessed fall last night per SNF staff. Underwent recent repair for left intertrochanteric fx at [facility] on 02/13 w/ subsequent acute blood loss anemia. She was profoundly hyponatremic and encephalopathic in the ED. Initial sNA 117, 4hr repeat sNa 121. She was given a 50ml bolus of 3% NS and 1L bolus of NS in ED. Of note, patient's  passed away yesterday and she has been experiencing anxiety per daughter. She arrives to the ICU stable.\"      Subjective/interval history  - Patient seen and examined, daughter at the bedside.  She is alert but seems to have some confusion.  Resting comfortably.  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 
Labs     03/01/24  1511   WBC 10.0   RBC 3.15*   HGB 9.8*   HCT 27.7*   MCV 87.9   RDW 15.4*        Recent Labs     03/01/24  1511 03/01/24  1524 03/01/24  1851   *  --  121*   K 4.0  --  3.7   CL 86*  --  93*   CO2 23  --  21   BUN 11  --  11   CREATININE 0.43* 0.4* 0.35*   GLUCOSE 79  --  74   PHOS  --   --  2.2*   MG  --   --  1.9     Recent Labs     03/01/24  1511   AST 28   ALT 26   BILITOT 1.5*   ALKPHOS 101     No results for input(s): \"PROTIME\", \"INR\" in the last 72 hours.  Medications: Reviewed 03/01/24  Imaging: Reviewed 03/01/24   Most Recent XR  XR CHEST STANDARD TWO VW 02/01/2024    Narrative  STUDY:PA / Lateral Chest    HISTORY: R05.1: Acute cough.    COMPARISONS: 11/16/2020..    FINDINGS/    Impression  Life-support devices and or cardiac hardware:    Heart and mediastinum:  The heart is normal in size. Tortuous atherosclerotic aorta noted. The mediastinal contours are normal.    Lungs: Hyperinflated lung fields with no acute airspace disease.  Minimal scarring seen in the left base. However this is unchanged compared to earlier examination of 2020.    Pleura: No evidence of pleural effusion or pneumothorax noted bilaterally.    Chest Wall / Osseous Structures: Patient with lower thoracic vertebroplasty.    Remainder stable.    Dictated By: Rosemary Dolan  Electronically Verified by: Rosemary Dolan 2/1/2024 3:16 PM    Most Recent CT  CT HEAD WO CONTRAST 03/01/2024    Narrative  EXAM: CT HEAD WO CONTRAST    INDICATION: AMS recent surgery with admission for hyponatremia, unwitnessed fall  last night    COMPARISON: None.    CONTRAST: None.    TECHNIQUE: Unenhanced CT of the head was performed using 5 mm images. Brain and  bone windows were generated. Coronal and sagittal reformats. CT dose reduction  was achieved through use of a standardized protocol tailored for this  examination and automatic exposure control for dose modulation.    FINDINGS:  Mild prominence of the ventricles and cortical sulci

## 2024-03-04 NOTE — ACP (ADVANCE CARE PLANNING)
Advance Care Planning     General Advance Care Planning (ACP) Conversation    Date of Conversation: 3/4/2024  Conducted with: Patient with Decision Making Capacity   Healthcare Decision Maker: Next of Kin by law (only applies in absence of above) (name) daughter at bedside    Healthcare Decision Maker:    Primary Decision Maker: Coby Chavez - [phone]  Click here to complete Healthcare Decision Makers including selection of the Healthcare Decision Maker Relationship (ie \"Primary\").   Today we documented Decision Maker(s) consistent with Legal Next of Kin hierarchy.    Content/Action Overview:  Has ACP document(s) NOT on file - requested patient to provide  Reviewed DNR/DNI and patient confirms current DNR status - completed forms on file (place new order if needed)  artificial nutrition, ventilation preferences, and resuscitation preferences      Following discussion with patient, she does not want to prolong her life if there would be no improvement in her quality of life.  She would not want chest compressions, and would not want to be intubated or placed on mechanical ventilator.  Her decisions were discussed with patient's daughter at bedside who agree with the plan.  Palliative care team is consulted following discussion with patient's daughter.  Patient reported no interest in being in the hospital or rehospitalization's.  She also reports no interest in overall hospital care.  Patient had recent loss of her .    Length of Voluntary ACP Conversation in minutes:  30 minutes    Kalin Roper MD

## 2024-03-05 PROBLEM — Z71.89 GOALS OF CARE, COUNSELING/DISCUSSION: Status: ACTIVE | Noted: 2024-03-05

## 2024-03-05 PROBLEM — Z51.5 PALLIATIVE CARE BY SPECIALIST: Status: ACTIVE | Noted: 2024-03-05

## 2024-03-05 PROBLEM — Z66 DNR (DO NOT RESUSCITATE): Status: ACTIVE | Noted: 2024-03-05

## 2024-03-05 PROBLEM — M25.552 PAIN OF LEFT HIP: Status: ACTIVE | Noted: 2024-03-05

## 2024-03-05 LAB
ANION GAP SERPL CALC-SCNC: 7 MMOL/L (ref 5–15)
BUN SERPL-MCNC: 16 MG/DL (ref 6–20)
BUN/CREAT SERPL: 34 (ref 12–20)
CALCIUM SERPL-MCNC: 8.6 MG/DL (ref 8.5–10.1)
CHLORIDE SERPL-SCNC: 101 MMOL/L (ref 97–108)
CO2 SERPL-SCNC: 23 MMOL/L (ref 21–32)
CREAT SERPL-MCNC: 0.47 MG/DL (ref 0.55–1.02)
ERYTHROCYTE [DISTWIDTH] IN BLOOD BY AUTOMATED COUNT: 17 % (ref 11.5–14.5)
GLUCOSE SERPL-MCNC: 103 MG/DL (ref 65–100)
HCT VFR BLD AUTO: 31.8 % (ref 35–47)
HGB BLD-MCNC: 11.2 G/DL (ref 11.5–16)
MAGNESIUM SERPL-MCNC: 2.2 MG/DL (ref 1.6–2.4)
MCH RBC QN AUTO: 30.9 PG (ref 26–34)
MCHC RBC AUTO-ENTMCNC: 35.2 G/DL (ref 30–36.5)
MCV RBC AUTO: 87.6 FL (ref 80–99)
NRBC # BLD: 0 K/UL (ref 0–0.01)
NRBC BLD-RTO: 0 PER 100 WBC
PHOSPHATE SERPL-MCNC: 2.7 MG/DL (ref 2.6–4.7)
PLATELET # BLD AUTO: 281 K/UL (ref 150–400)
PMV BLD AUTO: 8.5 FL (ref 8.9–12.9)
POTASSIUM SERPL-SCNC: 4.3 MMOL/L (ref 3.5–5.1)
RBC # BLD AUTO: 3.63 M/UL (ref 3.8–5.2)
SODIUM SERPL-SCNC: 131 MMOL/L (ref 136–145)
WBC # BLD AUTO: 7.4 K/UL (ref 3.6–11)

## 2024-03-05 PROCEDURE — 6370000000 HC RX 637 (ALT 250 FOR IP): Performed by: INTERNAL MEDICINE

## 2024-03-05 PROCEDURE — 2580000003 HC RX 258: Performed by: INTERNAL MEDICINE

## 2024-03-05 PROCEDURE — 84100 ASSAY OF PHOSPHORUS: CPT

## 2024-03-05 PROCEDURE — 83735 ASSAY OF MAGNESIUM: CPT

## 2024-03-05 PROCEDURE — 6360000002 HC RX W HCPCS: Performed by: INTERNAL MEDICINE

## 2024-03-05 PROCEDURE — 80048 BASIC METABOLIC PNL TOTAL CA: CPT

## 2024-03-05 PROCEDURE — 99223 1ST HOSP IP/OBS HIGH 75: CPT | Performed by: INTERNAL MEDICINE

## 2024-03-05 PROCEDURE — 97535 SELF CARE MNGMENT TRAINING: CPT

## 2024-03-05 PROCEDURE — 6370000000 HC RX 637 (ALT 250 FOR IP): Performed by: NURSE PRACTITIONER

## 2024-03-05 PROCEDURE — 2060000000 HC ICU INTERMEDIATE R&B

## 2024-03-05 PROCEDURE — 85027 COMPLETE CBC AUTOMATED: CPT

## 2024-03-05 PROCEDURE — 36415 COLL VENOUS BLD VENIPUNCTURE: CPT

## 2024-03-05 RX ORDER — OXYCODONE HYDROCHLORIDE 5 MG/1
2.5 TABLET ORAL DAILY PRN
Status: DISCONTINUED | OUTPATIENT
Start: 2024-03-05 | End: 2024-03-07 | Stop reason: HOSPADM

## 2024-03-05 RX ORDER — ACETAMINOPHEN 325 MG/1
650 TABLET ORAL 3 TIMES DAILY
Status: DISCONTINUED | OUTPATIENT
Start: 2024-03-05 | End: 2024-03-07 | Stop reason: HOSPADM

## 2024-03-05 RX ORDER — OXYCODONE HYDROCHLORIDE 5 MG/1
2.5 TABLET ORAL EVERY 4 HOURS PRN
Status: DISCONTINUED | OUTPATIENT
Start: 2024-03-05 | End: 2024-03-07 | Stop reason: HOSPADM

## 2024-03-05 RX ORDER — OXYCODONE HYDROCHLORIDE 5 MG/1
5 TABLET ORAL DAILY PRN
Status: DISCONTINUED | OUTPATIENT
Start: 2024-03-05 | End: 2024-03-05

## 2024-03-05 RX ORDER — OXYCODONE HYDROCHLORIDE 5 MG/1
5 TABLET ORAL EVERY 4 HOURS PRN
Status: DISCONTINUED | OUTPATIENT
Start: 2024-03-05 | End: 2024-03-05

## 2024-03-05 RX ADMIN — ACETAMINOPHEN 650 MG: 325 TABLET ORAL at 15:27

## 2024-03-05 RX ADMIN — SODIUM CHLORIDE, PRESERVATIVE FREE 10 ML: 5 INJECTION INTRAVENOUS at 21:13

## 2024-03-05 RX ADMIN — SODIUM CHLORIDE 1 G: 1 TABLET ORAL at 09:18

## 2024-03-05 RX ADMIN — ACETAMINOPHEN 650 MG: 325 TABLET ORAL at 21:13

## 2024-03-05 RX ADMIN — Medication 2.5 MG: at 10:43

## 2024-03-05 RX ADMIN — SODIUM CHLORIDE, PRESERVATIVE FREE 10 ML: 5 INJECTION INTRAVENOUS at 09:18

## 2024-03-05 RX ADMIN — Medication 6 MG: at 21:13

## 2024-03-05 RX ADMIN — SODIUM CHLORIDE 1 G: 1 TABLET ORAL at 12:36

## 2024-03-05 RX ADMIN — WATER 1000 MG: 1 INJECTION INTRAMUSCULAR; INTRAVENOUS; SUBCUTANEOUS at 19:30

## 2024-03-05 RX ADMIN — OXYBUTYNIN CHLORIDE 5 MG: 5 TABLET, EXTENDED RELEASE ORAL at 09:18

## 2024-03-05 RX ADMIN — PANTOPRAZOLE SODIUM 40 MG: 40 TABLET, DELAYED RELEASE ORAL at 09:17

## 2024-03-05 RX ADMIN — SODIUM CHLORIDE 1 G: 1 TABLET ORAL at 16:31

## 2024-03-05 RX ADMIN — ENOXAPARIN SODIUM 40 MG: 100 INJECTION SUBCUTANEOUS at 12:05

## 2024-03-05 ASSESSMENT — PAIN SCALES - GENERAL: PAINLEVEL_OUTOF10: 0

## 2024-03-05 NOTE — CARE COORDINATION
Care Management Initial Assessment       RUR: 14% Moderate   Readmission? No  1st IM letter given? Yes - 03/05/24  1st  letter given: No n/a        03/05/24 9147   Service Assessment   Patient Orientation Alert and Oriented;Person   Cognition Short Term Memory Deficit   History Provided By Child/Family   Primary Caregiver Other (Comment)  (SNF previous to hospitalization - dgt when home until regains independence)   Accompanied By/Relationship kayla Coby Chavez [phone]   Support Systems Children;Family Members   Patient's Healthcare Decision Maker is: Named in Scanned ACP Document   PCP Verified by CM Yes   Last Visit to PCP Within last 3 months   Prior Functional Level Assistance with the following:;Bathing;Dressing;Cooking;Housework;Shopping;Mobility   Current Functional Level Assistance with the following:;Bathing;Dressing;Feeding;Cooking;Housework;Shopping;Mobility   Can patient return to prior living arrangement No   Ability to make needs known: Fair   Family able to assist with home care needs: Yes  (after a snf rehab stay)   Would you like for me to discuss the discharge plan with any other family members/significant others, and if so, who? Yes  (dgt Cobyvinicius Chavez)   Financial Resources Medicare   CM/SW Referral Other (see comment)  (SNF)   Social/Functional History   Lives With Alone   Home Layout One level   Home Access Stairs to enter with rails   Entrance Stairs - Number of Steps 2   Bathroom Shower/Tub Walk-in shower   Home Equipment Cane;Rollator;Walker, rolling;Wheelchair-manual   Receives Help From Family   Condition of Participation: Discharge Planning   The Plan for Transition of Care is related to the following treatment goals: Short term rehab before return home with family and paid caregivers inthe home   The Patient and/or Patient Representative was provided with a Choice of Provider? Patient Representative   Name of the Patient Representative who was provided with the Choice of

## 2024-03-05 NOTE — CONSULTS
Consultation Note    NAME: Lesly Ybarra   :  1935   MRN:  [id number]     Date/Time:  3/1/2024 5:40 PM    I have been asked to see this patient by Dr. Shannon  for advice/opinion re: severe hyponatremia.     Assessment :    Plan:  Acute on chronic hyponatremia  Likely SIADH  Recent femur fracture  AMS  H/o urine retention Na izzy at recent hospitalization was 113 on 24. Up to 127 after 100 ml 3 % NS and Tolvaptan 15 mg on .     Sodium now 117    I would recommend stopping SSRI (Zoloft)    To get 50 ml 3 % NS (awaiting repeat BMP after a day of no PO intake and 1 liter NS bolus). Goal rise in sodium will be 6-8 from the pending stat sodium).    Once more alert, can use Tolvaptan again.     From recent note by Dr. Lew Calderon at [facility]:    hyponatremia,symptomatic,new onset this admission, initialy suspected thiazide  but likely SIADH  Hx of bladder ca  anemia  s/p hip surgery,in pain    HCTZ stopped   s/p  3% saline 100 cc IV over 30 min on 24  Tolvaptan 15 mg on 24 PM  sodium 113---118---120---127  FR 1200 cc daily  Subjective:   CHIEF COMPLAINT:  \"hyponatremia    HISTORY OF PRESENT ILLNESS:     Lesly GARCÍA is a 88 y.o.   female who has a history of the below. Admitted from SNF with ams and recurrent hyponatremia. I spoke with her daughter at bedside.  Ms. Ybarra has had poor intake throughout her stay at SNF until yesterday.  She is continue to be somewhat agitated/anxious through her SNF stay.  Zoloft was started for this.  Today she was noted to have marked lethargy and altered mental status.  [facility] chart was reviewed.  Notes from her SNF were reviewed.  [facility] notes were reviewed    Past Medical History:   Diagnosis Date    Cancer (HCC)     lymphoma    Hypertension       Past Surgical History:   Procedure Laterality Date    HEENT      tonsillectomy    ORTHOPEDIC SURGERY  2014    lumbar fracture     Social History     Tobacco Use    Smoking status: Former     Current packs/day: 0.00 
expression or smile  Activity (Movement): Laid quietly, normal position  Guarding: Lying quietly, no positioning of hands over areas of bod  Physiology (Vital Signs): Stable vital signs  Respiratory: Baseline RR/SpO2 compliant with ventilator  NVPS Score : 0    RDOS:         Vital Signs: Blood pressure (!) 122/56, pulse 83, temperature 99 °F (37.2 °C), temperature source Axillary, resp. rate 19, height 1.702 m (5' 7\"), weight 55.3 kg (121 lb 14.6 oz), SpO2 95 %.    PHYSICAL ASSESSMENT:   General: [x] Oriented x3  [] Well appearing  [] Intubated  []Ill appearing  []Other:  Mental Status: [x] Normal mental status exam  [] Drowsy  [] Confused  []Other:  Cardiovascular: [] Regular rate/rhythm  [] Arrhythmia  [] Other:  Chest: [x] Effort normal  []Lungs clear  [] Respiratory distress  []Tachypnea  [] Other:  Abdomen: [x] Soft/non-tender  [] Normal appearance  [] Distended  [] Ascites  [] Other:  Neurological: [] Normal speech  [] Normal sensation  []Deficits present:  Extremity: [x] Normal skin color/temp  [] Clubbing/cyanosis  [] No edema  [] Other:    Wt Readings from Last 15 Encounters:   03/04/24 55.3 kg (121 lb 14.6 oz)   02/29/24 52.2 kg (115 lb)   06/09/22 57.2 kg (126 lb)   09/03/21 58.5 kg (129 lb)        Current Diet: ADULT DIET; Regular; 1200 ml  ADULT ORAL NUTRITION SUPPLEMENT; Lunch; Frozen Oral Supplement  ADULT ORAL NUTRITION SUPPLEMENT; Dinner; Standard High Calorie/High Protein Oral Supplement       PSYCHOSOCIAL/SPIRITUAL SCREENING:   Palliative IDT has assessed this patient for cultural preferences / practices and a referral made as appropriate to needs (Cultural Services, Patient Advocacy, Ethics, etc.)    Spiritual Affiliation: Episcopal    Any spiritual / Druze concerns:  [] Yes /  [x] No   If \"Yes\" to discuss with pastoral care during IDT     Does caregiver feel burdened by caring for their loved one:   [] Yes /  [x] No /  [] No Caregiver Present/Available [] No Caregiver [] Pt Lives at 
[facility]    Hypophosphatemia  - replete prn    Acute anemia  - Hb 7 today and pt consented to BT  - will await afternoon labs  - transfuse to keep Hb>7    HTN  - no HCTZ, spironolactone  - BP stable without meds  - monitor while on IVF    Anxiety/depression  - no SSRI/SNRI    Bladder CA    Osteoporosis    Late entry for addendum 3/3/2024 at 1850:   Pt was transferred to our service this evening about 1800 as pt was stable. Labs this evening show Na down to 122. D/w intensivist who will keep patient on their service. Attending name changed and transfer cancelled. Advised CCM to reconsult HM once patient stable for transfer out of ICU.     DIET: ADULT DIET; Regular  ADULT ORAL NUTRITION SUPPLEMENT; AM Snack, PM Snack, HS Snack; Low Calorie/High Protein Oral Supplement   ISOLATION PRECAUTIONS: No active isolations  CODE STATUS: Full Code   Central Line:   none  DVT PROPHYLAXIS: SCDs; stop enoxaparin  EARLY MOBILITY ASSESSMENT: Recommend an assessment from physical therapy and/or occupational therapy  ANTICIPATED DISCHARGE: Greater than 48 hours.  ANTICIPATED DISPOSITION: came from [facility]  EMERGENCY CONTACT/SURROGATE DECISION MAKER: Coby Chavez (Child)  [phone] (Mobile)       Signed By: Riddhi Melendrez MD     March 3, 2024         Please note that this dictation may have been completed with Dragon, the c-LEcta voice recognition software.  Quite often unanticipated grammatical, syntax, homophones, and other interpretive errors are inadvertently transcribed by the computer software.  Please disregard these errors.  Please excuse any errors that have escaped final proofreading.

## 2024-03-06 LAB
ANION GAP SERPL CALC-SCNC: 4 MMOL/L (ref 5–15)
BUN SERPL-MCNC: 14 MG/DL (ref 6–20)
BUN/CREAT SERPL: 34 (ref 12–20)
CALCIUM SERPL-MCNC: 8.8 MG/DL (ref 8.5–10.1)
CHLORIDE SERPL-SCNC: 103 MMOL/L (ref 97–108)
CO2 SERPL-SCNC: 24 MMOL/L (ref 21–32)
CREAT SERPL-MCNC: 0.41 MG/DL (ref 0.55–1.02)
ERYTHROCYTE [DISTWIDTH] IN BLOOD BY AUTOMATED COUNT: 16.8 % (ref 11.5–14.5)
GLUCOSE SERPL-MCNC: 92 MG/DL (ref 65–100)
HCT VFR BLD AUTO: 34.1 % (ref 35–47)
HGB BLD-MCNC: 11.7 G/DL (ref 11.5–16)
MAGNESIUM SERPL-MCNC: 2.2 MG/DL (ref 1.6–2.4)
MCH RBC QN AUTO: 30.4 PG (ref 26–34)
MCHC RBC AUTO-ENTMCNC: 34.3 G/DL (ref 30–36.5)
MCV RBC AUTO: 88.6 FL (ref 80–99)
NRBC # BLD: 0 K/UL (ref 0–0.01)
NRBC BLD-RTO: 0 PER 100 WBC
PHOSPHATE SERPL-MCNC: 3.4 MG/DL (ref 2.6–4.7)
PLATELET # BLD AUTO: 260 K/UL (ref 150–400)
PMV BLD AUTO: 8.6 FL (ref 8.9–12.9)
POTASSIUM SERPL-SCNC: 4.3 MMOL/L (ref 3.5–5.1)
RBC # BLD AUTO: 3.85 M/UL (ref 3.8–5.2)
SODIUM SERPL-SCNC: 131 MMOL/L (ref 136–145)
WBC # BLD AUTO: 6.6 K/UL (ref 3.6–11)

## 2024-03-06 PROCEDURE — 6370000000 HC RX 637 (ALT 250 FOR IP): Performed by: INTERNAL MEDICINE

## 2024-03-06 PROCEDURE — 80048 BASIC METABOLIC PNL TOTAL CA: CPT

## 2024-03-06 PROCEDURE — 2580000003 HC RX 258: Performed by: INTERNAL MEDICINE

## 2024-03-06 PROCEDURE — 6370000000 HC RX 637 (ALT 250 FOR IP): Performed by: NURSE PRACTITIONER

## 2024-03-06 PROCEDURE — 99232 SBSQ HOSP IP/OBS MODERATE 35: CPT | Performed by: INTERNAL MEDICINE

## 2024-03-06 PROCEDURE — 6360000002 HC RX W HCPCS: Performed by: INTERNAL MEDICINE

## 2024-03-06 PROCEDURE — 1100000000 HC RM PRIVATE

## 2024-03-06 PROCEDURE — 85027 COMPLETE CBC AUTOMATED: CPT

## 2024-03-06 PROCEDURE — 36415 COLL VENOUS BLD VENIPUNCTURE: CPT

## 2024-03-06 PROCEDURE — 97530 THERAPEUTIC ACTIVITIES: CPT

## 2024-03-06 PROCEDURE — 84100 ASSAY OF PHOSPHORUS: CPT

## 2024-03-06 PROCEDURE — 83735 ASSAY OF MAGNESIUM: CPT

## 2024-03-06 RX ORDER — OXYCODONE HCL 5 MG/5 ML
2.5 SOLUTION, ORAL ORAL EVERY 4 HOURS PRN
Qty: 20 ML | Refills: 0 | Status: SHIPPED | OUTPATIENT
Start: 2024-03-06 | End: 2024-03-11

## 2024-03-06 RX ORDER — SODIUM CHLORIDE 1 G/1
1 TABLET ORAL
Qty: 90 TABLET | Refills: 0 | DISCHARGE
Start: 2024-03-06

## 2024-03-06 RX ADMIN — Medication 6 MG: at 21:06

## 2024-03-06 RX ADMIN — LATANOPROST 1 DROP: 50 SOLUTION OPHTHALMIC at 21:20

## 2024-03-06 RX ADMIN — ACETAMINOPHEN 650 MG: 325 TABLET ORAL at 21:06

## 2024-03-06 RX ADMIN — ENOXAPARIN SODIUM 40 MG: 100 INJECTION SUBCUTANEOUS at 11:48

## 2024-03-06 RX ADMIN — SODIUM CHLORIDE, PRESERVATIVE FREE 10 ML: 5 INJECTION INTRAVENOUS at 21:12

## 2024-03-06 RX ADMIN — SODIUM CHLORIDE 1 G: 1 TABLET ORAL at 17:29

## 2024-03-06 RX ADMIN — SODIUM CHLORIDE 1 G: 1 TABLET ORAL at 11:48

## 2024-03-06 RX ADMIN — ACETAMINOPHEN 650 MG: 325 TABLET ORAL at 14:31

## 2024-03-06 RX ADMIN — SODIUM CHLORIDE 1 G: 1 TABLET ORAL at 08:29

## 2024-03-06 RX ADMIN — OXYCODONE 2.5 MG: 5 TABLET ORAL at 22:10

## 2024-03-06 RX ADMIN — PANTOPRAZOLE SODIUM 40 MG: 40 TABLET, DELAYED RELEASE ORAL at 08:48

## 2024-03-06 RX ADMIN — SODIUM CHLORIDE, PRESERVATIVE FREE 10 ML: 5 INJECTION INTRAVENOUS at 08:57

## 2024-03-06 RX ADMIN — LATANOPROST 1 DROP: 50 SOLUTION OPHTHALMIC at 00:20

## 2024-03-06 RX ADMIN — Medication 2.5 MG: at 08:49

## 2024-03-06 RX ADMIN — OXYCODONE 2.5 MG: 5 TABLET ORAL at 01:35

## 2024-03-06 RX ADMIN — OXYBUTYNIN CHLORIDE 5 MG: 5 TABLET, EXTENDED RELEASE ORAL at 09:45

## 2024-03-06 RX ADMIN — ACETAMINOPHEN 650 MG: 325 TABLET ORAL at 08:47

## 2024-03-06 RX ADMIN — OXYCODONE 2.5 MG: 5 TABLET ORAL at 08:48

## 2024-03-06 ASSESSMENT — PAIN DESCRIPTION - FREQUENCY: FREQUENCY: INTERMITTENT

## 2024-03-06 ASSESSMENT — PAIN DESCRIPTION - LOCATION
LOCATION: HIP;BACK
LOCATION: HIP

## 2024-03-06 ASSESSMENT — PAIN DESCRIPTION - ORIENTATION: ORIENTATION: LEFT

## 2024-03-06 ASSESSMENT — PAIN SCALES - GENERAL
PAINLEVEL_OUTOF10: 6
PAINLEVEL_OUTOF10: 4
PAINLEVEL_OUTOF10: 5

## 2024-03-06 ASSESSMENT — PAIN DESCRIPTION - DESCRIPTORS
DESCRIPTORS: ACHING
DESCRIPTORS: ACHING

## 2024-03-06 ASSESSMENT — PAIN DESCRIPTION - ONSET: ONSET: GRADUAL

## 2024-03-06 NOTE — CARE COORDINATION
Transition of Care Plan:    RUR: 13% Low   Prior Level of Functioning: Assistance with the following:;Bathing;Dressing;Cooking;Housework;Shopping;Mobiliy   Disposition: SNF  If SNF or IPR: Date FOC offered: 03/05  Date FOC received: 03/05  Accepting facility: [facility] - offered bed this am and updated no bed until tomorrow  Date authorization started with reference number: n/a  Date authorization received and expires: n/a  Follow up appointments: On AVS   DME needed: None identified    Transportation at discharge: BLS vs wc  IM/IMM Medicare/ letter given: 03/05       Caregiver Contact: Coby Chavez [phone]   Discharge Caregiver contacted prior to discharge? Yes   Care Conference needed? No   Barriers to discharge: Medically ready - bed at [facility] not available until tomorrow/Thursday.      Disposition: SNF  Accepting facility: [facility] [address]- offered bed this am and updated they cannot take this admission until tomorrow morning.  Call report # [phone]  Transportation: [address] 9:00 THURSDAY     Updated attending MD, charge nurse, dgt, and bedside nurse.    MATI Webster   or by Perfect Sarah

## 2024-03-06 NOTE — DISCHARGE INSTRUCTIONS
Fluid restriction 1200 ml PER DAY.             Discharge SNF/Rehab Instructions/LTAC       PATIENT ID: Lesly Ybarra  MRN: [id number]   YOB: 1935    DATE OF ADMISSION: [unfilled]    DATE OF DISCHARGE: 3/7/2024    PRIMARY CARE PROVIDER: @PCP@       ATTENDING PHYSICIAN: [unfilled]  DISCHARGING PROVIDER: Michael Hall MD     To contact this individual call [phone] and ask the  to page.   If unavailable ask to be transferred the Adult Hospitalist Department.    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  -S/p tolvaptan and 3% saline   -nephro on board  -continue top monitor labs   -Stay off SSRIs  -FR 1200 mL  -NaCl tablets     E. Coli UTI  -One more dose of ceftriaxone is due  -Continue management     Anemia  -Monitor Hb and transfuse for <7    Acute grief  -Lost spouse this week  -Monitor     Hypokalemia  Hypomagnesemia  Hypophosphatemia  -Replace as needed     Acute urinary retention  -Garcias in place  -Voiding trial recommended    PENDING TEST RESULTS:   At the time of discharge the following test results are still pending: none    FOLLOW UP APPOINTMENTS:    PCP  Nephrology       ADDITIONAL CARE RECOMMENDATIONS:   BMP in 3-4 days    DIET: cardiac diet    ACTIVITY: activity as tolerated      DISCHARGE MEDICATIONS:   See Medication Reconciliation Form      NOTIFY YOUR PHYSICIAN FOR ANY OF THE FOLLOWING:   Fever over 101 degrees for 24 hours.   Chest pain, shortness of breath, fever, chills, nausea, vomiting, diarrhea, change in mentation, falling, weakness, bleeding. Severe pain or pain not relieved by medications.  Or, any other signs or symptoms that you may have questions about.    DISPOSITION:    Home With:   OT  PT  HH  RN      x SNF/Inpatient Rehab/LTAC    Independent/assisted living    Hospice    Other:

## 2024-03-06 NOTE — CARE COORDINATION
Transition of Care Plan to SNF/Rehab    Communication to Patient/Family:  Met with patient and family and they are agreeable to the transition plan. The Plan for Transition of Care is related to the following treatment goals: SNF short term rehab     The Patient and/or patient representative was provided with a choice of provider and agrees  with the discharge plan.      Yes [x] No []    A Freedom of choice list was provided with basic dialogue that supports the patient's individualized plan of care/goals and shares the quality data associated with the providers.       Yes [x] No []    SNF/Rehab Transition:  Patient has been accepted to [facility] SNF/[facility]hab and meets criteria for admission.   Patient will transported by [facility] stretcher and expected to leave at 9:00 Thursday.     Communication to SNF/Rehab:  Bedside RN, ***, has been notified to update the transition plan to the facility and call report (phone number).  Discharge information has been updated on the AVS. And communicated to facility via eyetok/All Scripts, or CC link.     Discharge instructions to be included in dc folder.    [] BCPI-A  Patient has been identified as part of the  BCPI-A Program.  For Care Coordination associated with that Bundle Program, please contact   Bundle information has been communication to     Nursing Please include all hard scripts for controlled substances, med rec and dc summary, and AVS in packet.     Reviewed and confirmed with facility, [facility], can manage the patient care needs for the following:     Levi with (X) only those applicable:  Medication:  [x]Medications are available at the facility  []IV Antibiotics    []Controlled Substance - hard copies available sent.  []Weekly Labs    Equipment:  []CPAP/BiPAP  []Wound Vacuum  []Garcias or Urinary Device  []PICC/Central Line  []Nebulizer  []Ventilator    Treatment:  []Isolation (for MRSA, VRE, etc.)  []Surgical Drain Management  []Tracheostomy Care  []Dressing

## 2024-03-07 VITALS
BODY MASS INDEX: 18.99 KG/M2 | HEART RATE: 95 BPM | RESPIRATION RATE: 16 BRPM | OXYGEN SATURATION: 93 % | SYSTOLIC BLOOD PRESSURE: 119 MMHG | TEMPERATURE: 97.5 F | HEIGHT: 67 IN | WEIGHT: 121 LBS | DIASTOLIC BLOOD PRESSURE: 57 MMHG

## 2024-03-07 LAB
ANION GAP SERPL CALC-SCNC: 5 MMOL/L (ref 5–15)
BUN SERPL-MCNC: 20 MG/DL (ref 6–20)
BUN/CREAT SERPL: 33 (ref 12–20)
CALCIUM SERPL-MCNC: 9.2 MG/DL (ref 8.5–10.1)
CHLORIDE SERPL-SCNC: 100 MMOL/L (ref 97–108)
CO2 SERPL-SCNC: 25 MMOL/L (ref 21–32)
CREAT SERPL-MCNC: 0.6 MG/DL (ref 0.55–1.02)
ERYTHROCYTE [DISTWIDTH] IN BLOOD BY AUTOMATED COUNT: 16.4 % (ref 11.5–14.5)
GLUCOSE SERPL-MCNC: 94 MG/DL (ref 65–100)
HCT VFR BLD AUTO: 34.7 % (ref 35–47)
HGB BLD-MCNC: 11.6 G/DL (ref 11.5–16)
MAGNESIUM SERPL-MCNC: 2.2 MG/DL (ref 1.6–2.4)
MCH RBC QN AUTO: 30.4 PG (ref 26–34)
MCHC RBC AUTO-ENTMCNC: 33.4 G/DL (ref 30–36.5)
MCV RBC AUTO: 90.8 FL (ref 80–99)
NRBC # BLD: 0 K/UL (ref 0–0.01)
NRBC BLD-RTO: 0 PER 100 WBC
PHOSPHATE SERPL-MCNC: 2.8 MG/DL (ref 2.6–4.7)
PLATELET # BLD AUTO: 265 K/UL (ref 150–400)
PMV BLD AUTO: 8.9 FL (ref 8.9–12.9)
POTASSIUM SERPL-SCNC: 4.5 MMOL/L (ref 3.5–5.1)
RBC # BLD AUTO: 3.82 M/UL (ref 3.8–5.2)
SODIUM SERPL-SCNC: 130 MMOL/L (ref 136–145)
WBC # BLD AUTO: 7.5 K/UL (ref 3.6–11)

## 2024-03-07 PROCEDURE — 6370000000 HC RX 637 (ALT 250 FOR IP): Performed by: NURSE PRACTITIONER

## 2024-03-07 PROCEDURE — 80048 BASIC METABOLIC PNL TOTAL CA: CPT

## 2024-03-07 PROCEDURE — 6370000000 HC RX 637 (ALT 250 FOR IP): Performed by: INTERNAL MEDICINE

## 2024-03-07 PROCEDURE — 85027 COMPLETE CBC AUTOMATED: CPT

## 2024-03-07 PROCEDURE — 84100 ASSAY OF PHOSPHORUS: CPT

## 2024-03-07 PROCEDURE — 36415 COLL VENOUS BLD VENIPUNCTURE: CPT

## 2024-03-07 PROCEDURE — 2580000003 HC RX 258: Performed by: INTERNAL MEDICINE

## 2024-03-07 PROCEDURE — 83735 ASSAY OF MAGNESIUM: CPT

## 2024-03-07 RX ADMIN — ACETAMINOPHEN 650 MG: 325 TABLET ORAL at 09:09

## 2024-03-07 RX ADMIN — SODIUM CHLORIDE, PRESERVATIVE FREE 10 ML: 5 INJECTION INTRAVENOUS at 09:09

## 2024-03-07 RX ADMIN — PANTOPRAZOLE SODIUM 40 MG: 40 TABLET, DELAYED RELEASE ORAL at 09:09

## 2024-03-07 RX ADMIN — SODIUM CHLORIDE 1 G: 1 TABLET ORAL at 07:33

## 2024-03-07 RX ADMIN — OXYBUTYNIN CHLORIDE 5 MG: 5 TABLET, EXTENDED RELEASE ORAL at 09:09

## 2024-03-07 NOTE — CARE COORDINATION
Transition of Care Plan to SNF/Rehab    The Patient and/or patient representative was provided with a choice of provider and agrees  with the discharge plan.      Yes [x] No []    A Freedom of choice list was provided with basic dialogue that supports the patient's individualized plan of care/goals and shares the quality data associated with the providers.       Yes [x] No []    SNF/Rehab Transition:  Patient has been accepted to [facility] SNF/Rehab and meets criteria for admission.   Patient will transported by [facility] and expected to leave at ~10:00 on 3/7    Communication to SNF/Rehab:  Bedside RN Jasvir has been notified to update the transition plan to the facility and call report  [phone]  Discharge information has been updated on the AVS. And communicated to facility via [employer]/Cl[employer] App, or CC link.     Discharge instructions to be fax'd to facility at (Fax #). [phone]    Nursing Please include all hard scripts for controlled substances, med rec and dc summary, and AVS in packet.     Reviewed and confirmed with facility, [facility] can manage the patient care needs for the following:     Levi with (X) only those applicable:  Medication:  [x]Medications are available at the facility  []IV Antibiotics    [x]Controlled Substance - hard copies available sent.  []Weekly Labs    Equipment:  []CPAP/BiPAP  []Wound Vacuum  []Garcias or Urinary Device  []PICC/Central Line  []Nebulizer  []Ventilator    Treatment:  []Isolation (for MRSA, VRE, etc.)  []Surgical Drain Management  []Tracheostomy Care  []Dressing Changes  []Dialysis with transportation  []PEG Care  []Oxygen  []Daily Weights for Heart Failure    Dietary:  []Any diet limitations  []Tube Feedings   []Total Parenteral Management (TPN)    Financial Resources:  []Medicaid Application Completed    []UAI Completed and copy given to pt/family  and copy given to pt/family  []A screening has previously been completed.    []Level II Completed    [] Private pay

## 2024-03-07 NOTE — PROGRESS NOTES
Hospitalist Progress Note  Michael Hall MD  Answering service: [phone] OR [phone] from in house phone        Date of Service:  3/7/2024  NAME:  Lesly Ybarra  :  1935  MRN:  [id number]      Admission Summary:   Per H&P: \"Lesly Ybarra is a 89 y/o female  initially presented to the ED from SNF for AMS and abnormal labs, sNA 115. Daughter went to check on patient at SNF today and she had significant decline in mental status and mobility compared to yesterday when she was alert, active, and able to transition from w/c to bed. Possible unwitnessed fall last night per SNF staff. Underwent recent repair for left intertrochanteric fx at [facility] on  w/ subsequent acute blood loss anemia. She was profoundly hyponatremic and encephalopathic in the ED. Initial sNA 117, 4hr repeat sNa 121. She was given a 50ml bolus of 3% NS and 1L bolus of NS in ED. Of note, patient's  passed away yesterday and she has been experiencing anxiety per daughter. She arrives to the ICU stable.\"     Interval history / Subjective:   Follow up hyponatremia, UTI. No acute complaints.   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  -S/p tolvaptan and 3% saline   -nephro on board  -continue top monitor labs   -Stay off SSRIs  -FR 1200 mL  -NaCl tablets     E. Coli UTI  -One more dose of ceftriaxone is due  -Continue management     Anemia  -Monitor Hb and transfuse for <7    Acute grief  -Lost spouse this week  -Monitor     Hypokalemia  Hypomagnesemia  Hypophosphatemia  -Replace as needed     Acute urinary retention  -Garcias in place  -Voiding trial recommended    Medically ready for dischartge.      Code status: limited  Prophylaxis: SCDs  Care Plan discussed with: patient, nurse   Anticipated Disposition: SNF/LTC when medically cleared  
                                                                         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 Up to 127 with correct rate of correction after 100 ml 3 % NS and Tolvaptan 15 mg on .      Sodium izzy 117 this admission, sodium up to 127 with NS bolus, 3% and npo, then given d5w and ddavp and Na down to 121.  Given another bolus of 3%-> Na up to 136-> 128-> now holding at 131 this morning    Ct FR 1.2L/day and NaCl tabs      Stay off SSRI    Push solute intake    Stable for discharge from renal/Na standpoint. Trend Na level in 2wks          Subjective:     Chief Complaint:  Eating breakfast. C/o intermittent dizziness still     Review of Systems:    Symptom Y/N Comments  Symptom Y/N Comments   Fever/Chills    Chest Pain     Poor Appetite    Edema     Cough    Abdominal Pain     Sputum    Joint Pain     SOB/DOBSON    Pruritis/Rash     Nausea/vomit    Tolerating PT/OT     Diarrhea    Tolerating Diet     Constipation    Other       Could not obtain due to:      Objective:     VITALS:   Last 24hrs VS reviewed since prior progress note. Most recent are:  Vitals:    24 1200   BP: (!) 111/52   Pulse: 73   Resp: 16   Temp: 97.5 °F (36.4 °C)   SpO2: 98%       Intake/Output Summary (Last 24 hours) at 3/6/2024 1216  Last data filed at 3/6/2024 1000  Gross per 24 hour   Intake 440 ml   Output 1150 ml   Net -710 ml        Telemetry Reviewed:     PHYSICAL EXAM:  General: NAD        Lab Data Reviewed: (see below)    Medications Reviewed: (see below)    PMH/SH reviewed - no change compared to H&P  ________________________________________________________________________  Care Plan discussed with:  Patient Y    Family      RN     Care 
                                                                         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 Up to 127 with correct rate of correction after 100 ml 3 % NS and Tolvaptan 15 mg on .      Sodium izzy 117 this admission, sodium up to 127 with NS bolus, 3% and npo, then given d5w and ddavp and Na down to 121.  Given another bolus of 3% yesterday-> Na up to 136-> 128    FR and NaCl tabs added by crit care team     Stay off SSRI    Trend Na level          Subjective:     Chief Complaint:  Discussed case with ICU RN. No specific complaints from patient Chart reviewed. No family present at the time of my visit.      Review of Systems:    Symptom Y/N Comments  Symptom Y/N Comments   Fever/Chills    Chest Pain     Poor Appetite    Edema     Cough    Abdominal Pain     Sputum    Joint Pain     SOB/DOBSON    Pruritis/Rash     Nausea/vomit    Tolerating PT/OT     Diarrhea    Tolerating Diet     Constipation    Other       Could not obtain due to:      Objective:     VITALS:   Last 24hrs VS reviewed since prior progress note. Most recent are:  Vitals:    24 1218   BP: (!) 131/55   Pulse: 82   Resp:    Temp:    SpO2:        Intake/Output Summary (Last 24 hours) at 3/4/2024 1306  Last data filed at 3/4/2024 0800  Gross per 24 hour   Intake 1391.24 ml   Output 3150 ml   Net -1758.76 ml        Telemetry Reviewed:     PHYSICAL EXAM:  General: NAD  Garcias      Lab Data Reviewed: (see below)    Medications Reviewed: (see below)    PMH/SH reviewed - no change compared to H&P  ________________________________________________________________________  Care Plan discussed with:  Patient Y    Family      RN Y    Care Manager                    
                                                                         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 Up to 127 with correct rate of correction after 100 ml 3 % NS and Tolvaptan 15 mg on .      Sodium izzy 117 this admission, sodium up to 127 with NS bolus, 3% and npo, then given d5w and ddavp and Na down to 121.  Given another bolus of 3% yesterday-> Na up to 136-> 128-> 131 this morning    FR 1.2L/day and NaCl tabs added by crit care team     Stay off SSRI    Push solute intake    Trend Na level          Subjective:     Chief Complaint:  Eating breakfast. No specific complaints from patient   Review of Systems:    Symptom Y/N Comments  Symptom Y/N Comments   Fever/Chills    Chest Pain     Poor Appetite    Edema     Cough    Abdominal Pain     Sputum    Joint Pain     SOB/DOBSON    Pruritis/Rash     Nausea/vomit    Tolerating PT/OT     Diarrhea    Tolerating Diet     Constipation    Other       Could not obtain due to:      Objective:     VITALS:   Last 24hrs VS reviewed since prior progress note. Most recent are:  Vitals:    24 1008   BP:    Pulse: 90   Resp:    Temp:    SpO2:        Intake/Output Summary (Last 24 hours) at 3/5/2024 1040  Last data filed at 3/5/2024 0535  Gross per 24 hour   Intake 790 ml   Output 1625 ml   Net -835 ml        Telemetry Reviewed:     PHYSICAL EXAM:  General: NAD  Garcias      Lab Data Reviewed: (see below)    Medications Reviewed: (see below)    PMH/SH reviewed - no change compared to H&P  ________________________________________________________________________  Care Plan discussed with:  Patient Y    Family      RN     Care Manager                    Consultant:          Comments   >50% of 
                                                                         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 Up to 127 with correct rate of correction after 100 ml 3 % NS and Tolvaptan 15 mg on .      Sodium izzy 117, up to 127 (too fast a rate of correction) after 50 ml 3 % NS, 1 liter NS bolus and fluid restriction (no PO intake on 3/1 due to ams). Na better with D5W and ddavp to 125     Now off D5W     Stay off SSRI    Na management per ICU team for now. Will follow along for once out of ICU.          Subjective:     Chief Complaint:  more alert, but still not talkative. Chart reviewed. No family present at the time of my visit.      Review of Systems:    Symptom Y/N Comments  Symptom Y/N Comments   Fever/Chills    Chest Pain     Poor Appetite    Edema     Cough    Abdominal Pain     Sputum    Joint Pain     SOB/DOBSON    Pruritis/Rash     Nausea/vomit    Tolerating PT/OT     Diarrhea    Tolerating Diet     Constipation    Other       Could not obtain due to:      Objective:     VITALS:   Last 24hrs VS reviewed since prior progress note. Most recent are:  Vitals:    24 0700   BP: (!) 145/69   Pulse: 93   Resp: 15   Temp:    SpO2: 99%       Intake/Output Summary (Last 24 hours) at 3/2/2024 0752  Last data filed at 3/2/2024 0700  Gross per 24 hour   Intake 2330.64 ml   Output 450 ml   Net 1880.64 ml      Telemetry Reviewed:     PHYSICAL EXAM:  General: NAD      Lab Data Reviewed: (see below)    Medications Reviewed: (see below)    PMH/SH reviewed - no change compared to H&P  ________________________________________________________________________  Care Plan discussed with:  Patient     Family      RN     Care Manager                 
                                                                         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 Up to 127 with correct rate of correction after 100 ml 3 % NS and Tolvaptan 15 mg on .      Sodium izzy 117 this admission, sodium up to 127 with NS bolus, 3% and npo, then given d5w and ddavp and Na down to 121.  Given another 1 liter NS and sodium is now 130     Stay off SSRI    Na management per ICU team for now. Will follow along for once out of ICU.          Subjective:     Chief Complaint:  alert. We discussed the above. No specific complaint. Chart reviewed. No family present at the time of my visit.      Review of Systems:    Symptom Y/N Comments  Symptom Y/N Comments   Fever/Chills    Chest Pain     Poor Appetite    Edema     Cough    Abdominal Pain     Sputum    Joint Pain     SOB/DOBSON    Pruritis/Rash     Nausea/vomit    Tolerating PT/OT     Diarrhea    Tolerating Diet     Constipation    Other       Could not obtain due to:      Objective:     VITALS:   Last 24hrs VS reviewed since prior progress note. Most recent are:  Vitals:    24 0600   BP: (!) 144/68   Pulse: 85   Resp: 17   Temp:    SpO2: 95%       Intake/Output Summary (Last 24 hours) at 3/3/2024 0818  Last data filed at 3/3/2024 0550  Gross per 24 hour   Intake 1153.02 ml   Output 1770 ml   Net -616.98 ml        Telemetry Reviewed:     PHYSICAL EXAM:  General: NAD      Lab Data Reviewed: (see below)    Medications Reviewed: (see below)    PMH/SH reviewed - no change compared to H&P  ________________________________________________________________________  Care Plan discussed with:  Patient     Family      RN     Care Manager                    Consultant: 
                       [facility]     ICU TEAM Progress Note           Name: Lesly Ybarra   : 1935   MRN: [id number]   Date: 3/4/2024          CU PROBLEM LIST   -Severe hyponatremia  -Acute metabolic encephalopathy  -Urinary tract infection  -Acute grief  -Acute anemia     HISTORY OF PRESENT ILLNESS:     88-year-old with history of lymphoma, left lung cancer status post left upper lobe resection, urinary retention, prior hip fracture, recently admitted to SNF, presented for confusion and found to have low sodium of 117.    Hospital course  DDx; concern of for SIADH  Initially received a bolus of 3% normal saline, and subsequently noted to have marked elevation in sodium which is followed by DDAVP, D5W.  Noted to have too low for drop in sodium and again was bolused hypertonic saline and started on salt tabs.  Overall has been unclear if her symptoms of confusion/disorientation are related to hyponatremia versus other causes.    3/4 Sodium this a.m. remained stable at 128-130s.  No new neurologic symptoms.    SUBJECTIVE:   As above     NEUROLOGICAL:      Acute metabolic encephalopathy,  (overall improved)  DDx; medication related (?  Benzos /SSRI /opioid )UTI, acute illness, acute grief  Noted to have been on Ativan at [facility], will continue to hold off on Ativan for now.  No acute finding on CT head  --Avoid benzos, opioids, anxiolytics unless absolutely necessary  -- Correct electrolyte and treat illness     PULMONOLOGY:     KYLIE     CARDIOVASCULAR:     KYLIE     GASTROINTESTINAL:     Continue PPI  Continue bowel regimen-patient reports recent bowel movement.     RENAL/ELECTROLYTE/FLUIDS:     Hyponatremia  DDx; SIADH, diuretic use, low sodium diet  Post hypertonic saline,  SIADH could be related to SSRI, opioids, no acute finding on CT head or chest x-ray  Additional workup including TSH, cortisol within normal range  --Nephrology on consult appreciate recommendation  -Continue salt tablets  -Continue BMP 
        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 +  renal losses from HCTZ  -Na from 117-127 -->  130 with a slight drop in between   -tolerating PO  -allow natural correction and if SIADH doesn't resolve or is slow to resolve can add salt tabs     Acute UTI:  -ceftriaxone 1gm daily    -urine culture  pending-->  >> 100k GNRs     Acute grief:  -lost spouse this week  -dc ssri   -she is doing ok     Acute anemia:  -suspect dilutional, all cell lines are down   -check iron level and replaced with IV venofer if low  -transfuse 1 unit PRBCs this am   -increase ppi to BID      Low phos:  replaced with IV Kphos     Mag low: replaced with Mag 2 gm IV    K low:  replaced with IV K phos       Discussed with case management and hospitalist teams.  Ok for downgrade     HPI:  Improved      ICU DAILY CHECKLIST     Code Status:full   DVT Prophylaxis: lovenox   T/L/D: PIVs  SUP: ppi BID   Diet: regular   Activity Level: mobilize daily  PT OT     ABCDEF Bundle/Checklist Completed:Yes  Disposition: ok for downgrade  Multidisciplinary Rounds Completed: yes  Goals of Care Discussion/Palliative: yes  Patient/Family Updated: yes      OBJECTIVE  Vitals:    03/03/24 0500 03/03/24 0600 03/03/24 0800 03/03/24 0900   BP: (!) 153/68 (!) 144/68 (!) 133/58 (!) 131/54   Pulse: 80 85 76 98   Resp: 17 17 17 19   Temp:   99.6 °F (37.6 °C)    TempSrc:   Bladder    SpO2: 98% 95% 97% 98%   Weight:       Height:         EXAM:   GEN: awake alert and oriented   HEENT  -Head: NC/AT;  -Eyes: PERRL, EOMI. No discharge or redness;  -Ears: External ears are normal. Normal TMs.  -Nose: Normal nares.  -Mouth and throat: MMM. Normal gums, mucosa, palate,. Good dentition.  NECK: Supple, with no masses. No JVD   CV: RRR, no m/r/g.  LUNGS: CTAB, no w/r/c.  ABD: Soft, 
        [facility] ICU Progress Note        Lsely Ybarra  1935  [id number]  3/2/2024      Assessment and plan:  AMS:  related to acute hyponatremia  -resolved     Acute hyponatremia:  -multifactorial: volume depletion + SSRI + HCTZ  -Na from 117-127 -->  started on D5W -->  dropped to 125 -->  hold D5W  -repeat sodium due at 10AM, will likely allow slow self correction  -ok for Po      Acute UTI:  -ceftriaxone 1gm daily    -urine culture      Acute grief:  -lost spouse this week  -dc ssri     Discussed with case management.  Discussed with nephrology.      HPI:  AMS resolved with improved NA.  + UTI.  Suspect HCTZ and SSRI contributing to repeat episodes of hyponatremia.        ICU DAILY CHECKLIST     Code Status:full   DVT Prophylaxis: lovenox  T/L/D: PIVs  SUP: not indicated  Diet: regular  Activity Level: mobilize daily. Pt OT   ABCDEF Bundle/Checklist Completed:Yes  Disposition: cont Icu care  Multidisciplinary Rounds Completed: yes  Goals of Care Discussion/Palliative: yes  Patient/Family Updated: yes      OBJECTIVE  Vitals:    03/02/24 0500 03/02/24 0600 03/02/24 0700 03/02/24 0800   BP: 138/68 (!) 149/68 (!) 145/69 (!) 144/61   Pulse: 92 (!) 109 93 97   Resp: 17 22 15 20   Temp:    97.9 °F (36.6 °C)   TempSrc:    Oral   SpO2: 98% 95% 99% 96%   Weight:       Height:         EXAM:   GEN: awake alert and oriented   HEENT  -Head: NC/AT;  -Eyes: PERRL, EOMI. No discharge or redness;  -Ears: External ears are normal. Normal TMs.  -Nose: Normal nares.  -Mouth and throat: MMM. Normal gums, mucosa, palate,. Good dentition.  NECK: Supple, with no masses. No JVD   CV: RRR, no m/r/g.  LUNGS: CTAB, no w/r/c.  ABD: Soft, NT/ND, no masses, NTTP  SKIN: Warm, well perfused. No skin rashes or abnormal lesions.  EXT: No clubbing, cyanosis, or edema.  NEURO: Normal muscle strength and tone. No focal deficits.cranial nerves intact    LABS:     K 4.0  Cl 95    Cr 0.37  Hb 8.3  Plt 311    Imaging reviewed   Cxr 
  Palliative Medicine   [place] [phone] 1608 (COPE)        [facility] [phone] (COPE)      The Palliative Medicine SW and Dr. Parkinson met with the patient at bedside, she is alert, oriented, and very engaged- she appears much brighter affect today, stronger, etc. She shares that she sat in the chair for 1.5 hours and worked with physical therapy- shares that she is hoping to \"get independent again\" and her goal is to go to [facility], eager to go- she knows many people there, worked there for 20 years, and is hoping to continue to make gains. She also shares that the Oxycodone has provided significant relief for her, and she is appreciative.     We discussed Code Status, patient affirms again DNR status- she tells us, \"I don't want to live like that\" and wouldn't want \"things to keep me alive\" or those measures to try to keep her living- quality of life is important to her, she agrees with DDNR, and signs document herself today.     Patient making gains, we did discuss hospice w/ daughter Coby yesterday if patient does not make gains at rehab, however, she is much brighter and engaged today compared to yesterday- this may still be an option in the future if patient doesn't make gains like we hope or has additional setbacks. Coby has excellent insight into her mother's wishes and clinical picture. Goals are clear now for rehabilitation.     Palliative Medicine called and provided update to Coby, the patient's daughter/MPOA- we discussed that patient is much more engaging with us today, appears stronger, motivated, etc. Coby is happy to hear this- we provided medical update and updated her on possible discharge tomorrow. She is very appreciative of care from [facility] and our team for our support. We did discuss that patient completed her own DDNR today in alignment with her wishes, copies left at bedside for both Coby and patient.       Palliative Medicine following with you.     Thank you for including 
 2157-Received pt into ICU 12 2215-bs 69-treated per protocol  MEGAN Vee updated   2223-repeat bs 162  
0840: Report called to [place], taken by KIERRA Meraz  
1000: Pt Hgb 7.0, verbal orders received from Aneesh NASSAR for type & screen and CBC.     1100: Pt blood consent obtained, form placed in chart.    1630: 1u PRBC transfused.    1830: Pt Serum Na 122 (down from 130). Luis NASSAR notified. Orders received for 50cc bolus 3% NS, NaCl tabs, and repeat BMP 4hr post 3% NS infusion.      
Admit to ICU for acute hyponatremia with AMS  Na 117 here  115 at [facility] per report     This is second occurrence for this pt    Bolus 3% NS 50 ml x 1  Start gentle NA 75 ml/hr  Repeat sodium in 4 hours     Can correct 8 mmol in 24 hours    Renal consulted   
Called RN to get report. RN states she will call back when she comes out of a patient's room.   
Chart reviewed patient resting comfortably in bed VS stable.  Discussed with bedside nurse and NP patient's mental status had improved by time of admission to ICU.  Continue antibiotics for UTI likely contributing if not because of mental status change.  Continue maintenance fluids with follow-up serum sodiums.  Baseline serum sodium 117 has increased to 121 with 3% bolus.  Agree with discontinuation of SSRI.  Serial sodium checks with a goal increase in 24 hours 6-8 mmol/L.-Hypovolemic hyponatremia.   CCM time 15 ,minutes.  
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 Other cause.      Add protein supplement  Replace K     Cont to monitor.  If Na drops again will rebolus 3% 50 ml x 1      
I have discussed with the patient and daughter the rationale for blood component transfusion; its benefits in treating or preventing fatigue, organ damage, or death; and its risk which includes mild transfusion reactions, rare risk of blood borne infection, or more serious but rare reactions. I have discussed the alternatives to transfusion, including the risk and consequences of not receiving transfusion. The patient and daughter had an opportunity to ask questions and had agreed to proceed with transfusion of blood components.   
I participated in the IDT meeting where the plan of care for Lesly Ybarra was discussed on [facility] 7S2 INTENSIVE CARE. I reviewed the patient's medical record prior to this meeting.    We will continue to follow and assess for spiritual/emotional support during this hospitalization.    Please contact  paging service for any immediate needs.      _____________________________  Michelle Paredes M.Div., M.S., B.C.C.  Staff   [facility]  
Michael Bashir contacted this am via perfect serve to ensure discharge orders are completed. MD acknowledged request and is addressing this am.     Jasvir LOZADA called and gave report to Theresa at 0839     Case management requested for transportation to  patient at 1000 this morning.     Patient discharged with transportation services at 1018 am.           
Na  down to 122  Tolerating PO    AMS  resolved    Bolus  x 1      Updated daughter at bedside    Dry cough suspect due to atelectasis. Add IS    Plan to mobilize today      
Palliative Medicine      Code Status: DNR    Advance Care Planning:    The patient does not have AMD on file, her daughter Coby is LNOK and decision maker     Patient / Family Encounter Documentation    Participants (names): Dr. Parkinson, daughter Coby, patient also participated when able, hard of hearing and at times lethargic during encounter     Narrative: The Palliative Medicine SW and Dr. Parkinson met with the patient and her daughter Coby at bedside in order to introduce the role of Palliative Medicine and offer support. The patient and family are grieving, the patient's  of 77 years passed away just 5 days ago. Coby is their only surviving child. The patient is hard of hearing, but able to engage (she did fall asleep at end of encounter, but overall engaging).     Coby provides us with additional history, prior to 2/12- the patient fell caring for her , the patient was completely independent to include driving- however, since her fall it has been difficult to participate in PT due to pain, as well as confusion- daughter shares that at her stay at [facility], sodium levels were also problems there. Coby shared that the patient also (even prior to her  passing) was withdrawing, not wanting to work with therapies, not answering calls from her friends, etc.- she does see some improvement today in her mom.     Coby is hoping that the patient can improve with therapies and make gains- pain is a significant barrier to working with therapy, so we discussed with bedside RN and therapy team (MARY Rome) about medicating patient w/ pain medication prior to therapy sessions to see if this helps improve her ability to participate/tolerate.     We discuss Code Status- daughter affirms wish for DNR, shares that the patient has always been very clear on her wishes.     We discuss overall clinical picture- we do share our worry about the patient's ability to return to her previously independent baseline.. 
Received patient at 720pm   Gave report to Jasvir LOZADA.   
Repeat Na noted at 117. IVF changed by NP to D5W. Agree with management to slow rate of correction.   
Sodium 121 at 6:30 pm after poor po intake and 1 liter NS bolus. Check sodium after 3% to make sure no over correction.    Demetria Juarez  
Sodium dropped again to 122    Bolus 3%      Add salt tabs     Fluid restriction in place   
Sodium repeat 121    Also noted large volume diuresis this afternoon post NS bolus.  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     Repeat Dee and [facility]    Hold on additional IVF for now.    
Spiritual Care Assessment/Progress Note  [facility]    Name: Lesly Ybarra MRN: [id number]    Age: 88 y.o.     Sex: female   Language: English     Date: 3/3/2024            Total Time Calculated: 30 min              Spiritual Assessment begun in [facility] 7S2 INTENSIVE CARE  Service Provided For:: Patient and family together  Referral/Consult From:: Rounding  Encounter Overview/Reason : Initial Encounter, Spiritual/Emotional Needs, Advance Care Planning    Spiritual beliefs:      [x] Involved in a jose luis tradition/spiritual practice: Faith     [] Supported by a jose luis community:      [] Claims no spiritual orientation:      [] Seeking spiritual identity:           [] Adheres to an individual form of spirituality:      [] Not able to assess:                Identified resources for coping and support system:   Support System: Children       [] Prayer                  [] Devotional reading               [] Music                  [] Guided Imagery     [] Pet visits                                        [] Other: (COMMENT)     Specific area/focus of visit   Encounter:    Crisis:    Spiritual/Emotional needs: Type: Spiritual Support  Ritual, Rites and Sacraments:    Grief, Loss, and Adjustments:    Ethics/Mediation:    Behavioral Health:    Palliative Care:    Advance Care Planning: Type: ACP conversation    Plan/Referrals: Other (Comment) (Transferring to progressive care)    Narrative:     Initial assessment and visit with Lesly Ybarra. Her daughter, Coby was also present. I introduced myself and role, explored feelings and concerns. Ms. Edge a resting in a chair but shared that she was being transferred out of the ICU. We discussed advance care planning. Ms. Edge stated that she does have an advance directive. Her daughter, Coby is the POA and they will provide a copy to be placed in Ms. Edge's medical records. No other needs were articulated. I offered a blessing for her recovery.    For additional spiritual 
TRANSFER - OUT REPORT:    Verbal report given to KIERRA Marte on Lesly Ybarra  being transferred to [facility] for routine progression of patient care       Report consisted of patient's Situation, Background, Assessment and   Recommendations(SBAR).     Information from the following report(s) Nurse Handoff Report, Adult Overview, Recent Results, Cardiac Rhythm NS, Neuro Assessment, and Event Log was reviewed with the receiving nurse.           Lines:   Peripheral IV 03/03/24 Distal;Right;Ventral Forearm (Active)   Site Assessment Clean, dry & intact 03/06/24 0800   Line Status Infusing 03/06/24 0800   Line Care Connections checked and tightened;Cap changed 03/06/24 0800   Phlebitis Assessment No symptoms 03/06/24 0800   Infiltration Assessment 0 03/06/24 0800   Alcohol Cap Used Yes 03/06/24 0800   Dressing Status Clean, dry & intact 03/06/24 0800   Dressing Type Transparent 03/06/24 0800        Opportunity for questions and clarification was provided.      Patient transported with:  Registered Nurse        
TRANSFER - OUT REPORT:    Verbal report given to RN on Lesly Ybarra  being transferred to [facility] for routine progression of patient care       Report consisted of patient's Situation, Background, Assessment and   Recommendations(SBAR).     Information from the following report(s) Nurse Handoff Report, Intake/Output, Recent Results, Cardiac Rhythm SR w/PAC occasional, and Neuro Assessment was reviewed with the receiving nurse.           Lines:   Peripheral IV 03/03/24 Distal;Right;Ventral Forearm (Active)   Site Assessment Other (Comment) 03/05/24 0000   Line Status Blood return noted;Capped;Flushed 03/04/24 1955   Line Care Connections checked and tightened 03/04/24 1955   Phlebitis Assessment No symptoms 03/04/24 1955   Infiltration Assessment 0 03/04/24 1955   Alcohol Cap Used Yes 03/04/24 1955   Dressing Status Clean, dry & intact 03/04/24 1955   Dressing Type Transparent 03/04/24 1955        Opportunity for questions and clarification was provided.      0128 Patient transported with:  Monitor, Patient-specific medications from Pharmacy, and Registered Nurse        
Two attempts to call the nurse for report; Nurse busy will call back a soon as she is available  
Independent      Cognitive and Communication Status:  Neurologic State: Alert    Cognition: Follows commands    Dysphagia:  Oral Assessment:  Oral Motor   Labial: No impairment  Dentition: Natural;Intact  Oral Hygiene: Moist;Clean  Oral Hygiene Comments: few white spots on roof of mouth- informed RN  Lingual: No impairment  Velum: Unable to visualize  Mandible: No impairment  P.O. Trials:  PO Trials  Neuromuscular Estim Used: No  Assessment Method(s): Observation  Patient Position: up in bed  Vocal Quality: No Impairment  Consistency Presented: Thin;Regular  How Presented: Straw;Successive Swallows;SLP-fed/Presented  Bolus Acceptance: No impairment  Bolus Formation/Control: No impairment  Propulsion: No impairment  Oral Residue: None  Aspiration Signs/Symptoms: None  Pharyngeal Phase Characteristics: No impairment, issues, or problems  Effective Modifications: None                 Respiratory Status/Airway:  Room air            Functional Oral Intake Scale (FOIS): 7--Total oral diet with no restrictions    After treatment:   Patient left in no apparent distress in bed, Call bell left within reach, Nursing notified, and Caregiver present    COMMUNICATION/EDUCATION:       The patient's plan of care including recommendations, planned interventions, and recommended diet changes were discussed with: Registered nurse    Patient/family agree to work toward stated goals and plan of care    Thank you,  Luz Rosen M.S. CCC-SLP  Minutes: 17   
    Labs:     Recent Labs     03/05/24  0534 03/06/24  0516   WBC 7.4 6.6   HGB 11.2* 11.7   HCT 31.8* 34.1*    260       Recent Labs     03/04/24  0529 03/04/24  0538 03/04/24  1254 03/05/24  0534 03/06/24  0516   NA  --    < > 128* 131* 131*   K  --    < > 4.1 4.3 4.3   CL  --    < > 98 101 103   CO2  --    < > 25 23 24   BUN  --    < > 14 16 14   MG HEMOLYZED,RECOLLECT REQUESTED  --   --  2.2 2.2   PHOS 3.0  --   --  2.7 3.4    < > = values in this interval not displayed.       No results for input(s): \"ALT\", \"TP\", \"ALB\", \"GLOB\", \"GGT\", \"AML\" in the last 72 hours.    Invalid input(s): \"SGOT\", \"GPT\", \"AP\", \"TBIL\", \"TBILI\", \"AMYP\", \"LPSE\", \"HLPSE\"  No results for input(s): \"INR\", \"APTT\" in the last 72 hours.    Invalid input(s): \"PTP\"   No results for input(s): \"TIBC\", \"FERR\" in the last 72 hours.    Invalid input(s): \"FE\", \"PSAT\"     No results found for: \"FOL\", \"RBCF\"   No results for input(s): \"PH\", \"PCO2\", \"PO2\" in the last 72 hours.  No results for input(s): \"CPK\" in the last 72 hours.    Invalid input(s): \"CPKMB\", \"CKNDX\", \"TROIQ\"  No results found for: \"CHOL\", \"CHOLX\", \"CHLST\", \"CHOLV\", \"HDL\", \"HDLC\", \"LDL\", \"LDLC\", \"TGLX\", \"TRIGL\"  No results found for: \"GLUCPOC\"        Medications Reviewed:     Current Facility-Administered Medications   Medication Dose Route Frequency    acetaminophen (TYLENOL) tablet 650 mg  650 mg Oral TID    oxyCODONE (ROXICODONE) immediate release tablet 2.5 mg  2.5 mg Oral Q4H PRN    oxyCODONE (ROXICODONE) immediate release tablet 2.5 mg  2.5 mg Oral Daily PRN    pantoprazole (PROTONIX) tablet 40 mg  40 mg Oral Daily    enoxaparin (LOVENOX) injection 40 mg  40 mg SubCUTAneous Q24H    0.9 % sodium chloride infusion   IntraVENous PRN    sodium chloride tablet 1 g  1 g Oral TID WC    latanoprost (XALATAN) 0.005 % ophthalmic solution 1 drop  1 drop Both Eyes Nightly    biotin (VITAMIN B7) tablet 2.5 mg  2.5 mg Oral Daily    oxyBUTYnin (DITROPAN-XL) extended release tablet 5 mg  5 
03/04/24  0529 03/04/24  0538 03/04/24  1254 03/05/24  0534   * 122*   < >  --  128* 128* 131*   K 3.6 4.6   < >  --  HEMOLYZED,RECOLLECT REQUESTED 4.1 4.3    93*   < >  --  103 98 101   CO2 19* 23   < >  --  20* 25 23   BUN 9 19   < >  --  15 14 16   MG 1.7  --   --  HEMOLYZED,RECOLLECT REQUESTED  --   --  2.2   PHOS 1.5* 2.8  --  3.0  --   --  2.7    < > = values in this interval not displayed.     No results for input(s): \"ALT\", \"TP\", \"ALB\", \"GLOB\", \"GGT\", \"AML\" in the last 72 hours.    Invalid input(s): \"SGOT\", \"GPT\", \"AP\", \"TBIL\", \"TBILI\", \"AMYP\", \"LPSE\", \"HLPSE\"  No results for input(s): \"INR\", \"APTT\" in the last 72 hours.    Invalid input(s): \"PTP\"   Recent Labs     03/03/24  1106   TIBC 197*      No results found for: \"FOL\", \"RBCF\"   No results for input(s): \"PH\", \"PCO2\", \"PO2\" in the last 72 hours.  No results for input(s): \"CPK\" in the last 72 hours.    Invalid input(s): \"CPKMB\", \"CKNDX\", \"TROIQ\"  No results found for: \"CHOL\", \"CHOLX\", \"CHLST\", \"CHOLV\", \"HDL\", \"HDLC\", \"LDL\", \"LDLC\", \"TGLX\", \"TRIGL\"  No results found for: \"GLUCPOC\"        Medications Reviewed:     Current Facility-Administered Medications   Medication Dose Route Frequency    pantoprazole (PROTONIX) tablet 40 mg  40 mg Oral Daily    enoxaparin (LOVENOX) injection 40 mg  40 mg SubCUTAneous Q24H    0.9 % sodium chloride infusion   IntraVENous PRN    sodium chloride tablet 1 g  1 g Oral TID WC    latanoprost (XALATAN) 0.005 % ophthalmic solution 1 drop  1 drop Both Eyes Nightly    biotin (VITAMIN B7) tablet 2.5 mg  2.5 mg Oral Daily    oxyBUTYnin (DITROPAN-XL) extended release tablet 5 mg  5 mg Oral Daily    cefTRIAXone (ROCEPHIN) 1,000 mg in sterile water 10 mL IV syringe  1,000 mg IntraVENous Q24H    melatonin tablet 6 mg  6 mg Oral Nightly PRN    sodium chloride flush 0.9 % injection 5-40 mL  5-40 mL IntraVENous 2 times per day    sodium chloride flush 0.9 % injection 5-40 mL  5-40 mL IntraVENous PRN    0.9 % sodium chloride 
worse with movement  Frequency: daily       NVPS:  Adult Nonverbal Pain Scale (NVPS)  Face: No particular expression or smile  Activity (Movement): Laid quietly, normal position  Guarding: Lying quietly, no positioning of hands over areas of bod  Physiology (Vital Signs): Stable vital signs  Respiratory: Baseline RR/SpO2 compliant with ventilator  NVPS Score : 0    RDOS:         Vital Signs: Blood pressure (!) 111/52, pulse 92, temperature 97.5 °F (36.4 °C), temperature source Oral, resp. rate 16, height 1.702 m (5' 7\"), weight 55.3 kg (121 lb 14.6 oz), SpO2 98 %.    PHYSICAL ASSESSMENT:   General: [x] Oriented x3  [] Well appearing  [] Intubated  []Ill appearing  []Other:  Mental Status: [x] Normal mental status exam  [] Drowsy  [] Confused  []Other:  Cardiovascular: [] Regular rate/rhythm  [] Arrhythmia  [] Other:  Chest: [x] Effort normal  []Lungs clear  [] Respiratory distress  []Tachypnea  [] Other:  Abdomen: [x] Soft/non-tender  [] Normal appearance  [] Distended  [] Ascites  [] Other:  Neurological: [] Normal speech  [] Normal sensation  []Deficits present:  Extremity: [x] Normal skin color/temp  [] Clubbing/cyanosis  [] No edema  [] Other:    Wt Readings from Last 15 Encounters:   03/04/24 55.3 kg (121 lb 14.6 oz)   02/29/24 52.2 kg (115 lb)   06/09/22 57.2 kg (126 lb)   09/03/21 58.5 kg (129 lb)        Current Diet: ADULT DIET; Regular; 1200 ml  ADULT ORAL NUTRITION SUPPLEMENT; Lunch; Frozen Oral Supplement  ADULT ORAL NUTRITION SUPPLEMENT; Dinner; Standard High Calorie/High Protein Oral Supplement       PSYCHOSOCIAL/SPIRITUAL SCREENING:   Palliative IDT has assessed this patient for cultural preferences / practices and a referral made as appropriate to needs (Cultural Services, Patient Advocacy, Ethics, etc.)    Spiritual Affiliation: Hinduism    Any spiritual / Mormonism concerns:  [] Yes /  [x] No   If \"Yes\" to discuss with pastoral care during IDT     Does caregiver feel burdened by caring for their

## 2024-03-07 NOTE — PLAN OF CARE
Problem: Occupational Therapy - Adult  Goal: By Discharge: Performs self-care activities at highest level of function for planned discharge setting.  See evaluation for individualized goals.  Description: FUNCTIONAL STATUS PRIOR TO ADMISSION:  Patient was ambulatory using no DME   , ADL Assistance: Independent, Ambulation Assistance: Independent, Transfer Assistance: Independent,       HOME SUPPORT: Patient with spouse who recently just passed away, after fall pt was receiving therapy, at snf    Occupational Therapy Goals:  Initiated 3/3/2024  1.  Patient will perform self-feeding with [place] within 7 day(s).  2.  Patient will perform grooming with Minimal Assist within 7 day(s).  3.  Patient will perform bathing with Minimal Assist within 7 day(s).  4.  Patient will perform toilet transfers with Minimal Assist  within 7 day(s).  5.  Patient will perform all aspects of toileting with Minimal Assist within 7 day(s).  7.  Patient will utilize energy conservation techniques during functional activities with verbal cues within 7 day(s).   Outcome: Progressing     OCCUPATIONAL THERAPY TREATMENT  Patient: Lesly Ybarra (88 y.o. female)  Date: 3/5/2024  Primary Diagnosis: Somnolence [R40.0]  Hyponatremia [E87.1]  Acute cystitis without hematuria [N30.00]       Precautions: Weight Bearing   Left Lower Extremity Weight Bearing: Weight Bearing As Tolerated            Chart, occupational therapy assessment, plan of care, and goals were reviewed.    ASSESSMENT  Patient continues to benefit from skilled OT services and is slowly progressing towards goals. Pt cleared for therapy by nursing and received supine in bed agreeable to therapy with encouragement. Session completed at bed level today with pt participating in grooming tasks, rolling, and self feeding. Overall setup for ADLs. Repositioned in bed with Max Ax2. Continue to recommend SNF at discharge.          PLAN :  Patient continues to benefit from skilled 
  Problem: Physical Therapy - Adult  Goal: By Discharge: Performs mobility at highest level of function for planned discharge setting.  See evaluation for individualized goals.  Description: FUNCTIONAL STATUS PRIOR TO ADMISSION: Patient was independent and active without use of DME prior to fall. Pt fell when trying to help  off floor resulting in a hip fracture. Pt s/p IM nail on 2/13 at OSH and was at SNF receiving therapy. Per daughter, pt usually declined mobility at SNF but able to transfer to wheelchair with assistance at times.     HOME SUPPORT PRIOR TO ADMISSION: The patient lived with  prior to SNF. Of note, pt's  recently passed.    Physical Therapy Goals  Initiated 3/3/2024  1.  Patient will move from supine to sit and sit to supine in bed with contact guard assist within 7 day(s).    2.  Patient will perform sit to stand with contact guard assist within 7 day(s).  3.  Patient will transfer from bed to chair and chair to bed with contact guard assist using the least restrictive device within 7 day(s).  4.  Patient will ambulate with minimal assistance for 50 feet with the least restrictive device within 7 day(s).       Outcome: Progressing   PHYSICAL THERAPY TREATMENT    Patient: Lesly Ybarra (88 y.o. female)  Date: 3/6/2024  Diagnosis: Somnolence [R40.0]  Hyponatremia [E87.1]  Acute cystitis without hematuria [N30.00] Hyponatremia      Precautions: Weight Bearing   Left Lower Extremity Weight Bearing: Weight Bearing As Tolerated                  ASSESSMENT:  Patient continues to benefit from skilled PT services and is progressing towards goals. Pt tolerated session well, but continues to be limited by generalized weakness, decreased functional mobility, impaired balance and gait, impaired cognition. Pt demonstrated improvement in mobility this date. Pt required min A for bed mobility and transferred with RW with min A. Noted pt to have slight posterior lean initially and improved 
  Problem: Safety - Adult  Goal: Free from fall injury  3/7/2024 0858 by Rosanna Mercedes LPN  Outcome: Adequate for Discharge  3/7/2024 0858 by Rosanna Mercedes LPN  Outcome: Progressing     Problem: Discharge Planning  Goal: Discharge to home or other facility with appropriate resources  Outcome: Adequate for Discharge     Problem: Pain  Goal: Verbalizes/displays adequate comfort level or baseline comfort level  Outcome: Adequate for Discharge     Problem: Skin/Tissue Integrity  Goal: Absence of new skin breakdown  Description: 1.  Monitor for areas of redness and/or skin breakdown  2.  Assess vascular access sites hourly  3.  Every 4-6 hours minimum:  Change oxygen saturation probe site  4.  Every 4-6 hours:  If on nasal continuous positive airway pressure, respiratory therapy assess nares and determine need for appliance change or resting period.  Outcome: Adequate for Discharge     
  Problem: Safety - Adult  Goal: Free from fall injury  3/7/2024 0858 by Rosanna Mercedes LPN  Outcome: Adequate for Discharge  3/7/2024 0858 by Rosanna Mercedes LPN  Outcome: Progressing     Problem: Discharge Planning  Goal: Discharge to home or other facility with appropriate resources  Outcome: Adequate for Discharge     Problem: Pain  Goal: Verbalizes/displays adequate comfort level or baseline comfort level  Outcome: Adequate for Discharge     Problem: Skin/Tissue Integrity  Goal: Absence of new skin breakdown  Description: 1.  Monitor for areas of redness and/or skin breakdown  2.  Assess vascular access sites hourly  3.  Every 4-6 hours minimum:  Change oxygen saturation probe site  4.  Every 4-6 hours:  If on nasal continuous positive airway pressure, respiratory therapy assess nares and determine need for appliance change or resting period.  Outcome: Adequate for Discharge     
  Problem: Safety - Adult  Goal: Free from fall injury  Outcome: Progressing     
  Problem: Safety - Adult  Goal: Free from fall injury  Outcome: Progressing     Problem: Discharge Planning  Goal: Discharge to home or other facility with appropriate resources  Outcome: Progressing     Problem: Pain  Goal: Verbalizes/displays adequate comfort level or baseline comfort level  Outcome: Progressing     
  Problem: Safety - Adult  Goal: Free from fall injury  Outcome: Progressing     Problem: Pain  Goal: Verbalizes/displays adequate comfort level or baseline comfort level  Outcome: Progressing     Problem: Skin/Tissue Integrity  Goal: Absence of new skin breakdown  Description: 1.  Monitor for areas of redness and/or skin breakdown  2.  Assess vascular access sites hourly  3.  Every 4-6 hours minimum:  Change oxygen saturation probe site  4.  Every 4-6 hours:  If on nasal continuous positive airway pressure, respiratory therapy assess nares and determine need for appliance change or resting period.  Outcome: Progressing     
Despite encouragement, pt declining participation in ADLs this date. Pt left seated in bedside chair with all needs met. Continue to recommend d/c to SNF.          PLAN :  Patient continues to benefit from skilled intervention to address the above impairments.  Continue treatment per established plan of care to address goals.      Recommendation for discharge: (in order for the patient to meet his/her long term goals): Therapy up to 5 days/week in Skilled nursing facility    Other factors to consider for discharge: poor safety awareness, impaired cognition, and high risk for falls    IF patient discharges home will need the following DME: continuing to assess with progress       SUBJECTIVE:   Patient stated “I've already done all that.”    OBJECTIVE DATA SUMMARY:   Cognitive/Behavioral Status:  Orientation  Overall Orientation Status: Within Normal Limits  Orientation Level: Oriented X4  Cognition  Overall Cognitive Status: Exceptions  Arousal/Alertness: Appropriate responses to stimuli  Following Commands: Follows one step commands consistently  Attention Span: Difficulty dividing attention  Memory: Decreased recall of recent events;Decreased short term memory;Decreased recall of precautions  Safety Judgement: Decreased awareness of need for safety;Decreased awareness of need for assistance  Problem Solving: Decreased awareness of errors  Insights: Decreased awareness of deficits  Initiation: Requires cues for some  Sequencing: Requires cues for some    Functional Mobility and Transfers for ADLs:  Bed Mobility:  Bed Mobility Training  Rolling: Minimum assistance  Supine to Sit: Minimum assistance  Scooting: Minimum assistance;Additional time     Transfers:   Transfer Training  Transfer Training: Yes  Sit to Stand: Minimum assistance;Assist X1  Stand to Sit: Minimum assistance;Assist X1  Bed to Chair: Moderate assistance;Assist X1           Balance:  Standing: Impaired  Balance  Sitting: Impaired  Sitting - Static: 
Hearing  Education Outcome: Verbalized understanding;Continued education needed      Inez Knox, PT, DPT  Minutes: 26   
OT  Minutes: 24    Occupational Therapy Evaluation Charge Determination   History Examination Decision-Making   LOW Complexity : Brief history review  LOW Complexity: 1-3 Performance deficits relating to physical, cognitive, or psychosocial skills that result in activity limitations and/or participation restrictions LOW Complexity: No comorbidities that affect functional and  no verbal  or physical assist needed to complete eval tasks   Based on the above components, the patient evaluation is determined to be of the following complexity level: Low   
Rating:  Pt did not quantify pain  Pain Intervention(s):   nursing notified and rest    Activity Tolerance:   Fair     After treatment:   Patient left in no apparent distress sitting up in chair, Call bell within reach, Bed/ chair alarm activated, and Caregiver / family present    COMMUNICATION/EDUCATION:   The patient's plan of care was discussed with: occupational therapist and registered nurse    Patient Education  Education Given To: Patient;Family  Education Provided: Role of Therapy;Plan of Care;Equipment;Precautions;Transfer Training;Fall Prevention Strategies  Education Method: Demonstration;Verbal  Barriers to Learning: Hearing  Education Outcome: Verbalized understanding;Continued education needed    Thank you for this referral.  Joana Ontiveros, PT  Minutes: 23      Physical Therapy Evaluation Charge Determination   History Examination Presentation Decision-Making   HIGH Complexity :3+ comorbidities / personal factors will impact the outcome/ POC  LOW Complexity : 1-2 Standardized tests and measures addressing body structure, function, activity limitation and / or participation in recreation  MEDIUM Complexity : Evolving with changing characteristics  AM-PAC  MEDIUM   Based on the above components, the patient evaluation is determined to be of the following complexity level: Low    
referral.  Janis Hamilton, SHARON, OTR/L  Minutes: 29

## 2024-03-07 NOTE — DISCHARGE SUMMARY
Discharge Summary       PATIENT ID: Lesly Ybarra  MRN: [id number]   YOB: 1935    DATE OF ADMISSION: 3/1/2024  2:58 PM    DATE OF DISCHARGE: 3/6/24   PRIMARY CARE PROVIDER: Shaneka Benitez MD     ATTENDING PHYSICIAN: Jaylon Siegel MD  DISCHARGING PROVIDER: Jaylon Siegel MD    To contact this individual call [phone] and ask the  to page.  If unavailable ask to be transferred the Adult Hospitalist Department.    CONSULTATIONS: IP CONSULT TO NEPHROLOGY  IP CONSULT TO PALLIATIVE CARE  IP CONSULT TO PALLIATIVE CARE    PROCEDURES/SURGERIES: * No surgery found *    ADMITTING DIAGNOSES & HOSPITAL COURSE:   Per H&P:    Lesly Ybarra is a 87 y/o female w/ complex PMHx as below who initially presented to the ED from SNF for AMS and abnormal labs, sNA 115. Daughter went to check on patient at SNF today and she had significant decline in mental status and mobility compared to yesterday when she was alert, active, and able to transition from w/c to bed. Possible unwitnessed fall last night per SNF staff. Underwent recent repair for left intertrochanteric fx at [facility] on 02/13 w/ subsequent acute blood loss anemia. She was profoundly hyponatremic and encephalopathic in the ED. Initial sNA 117, 4hr repeat sNa 121. She was given a 50ml bolus of 3% NS and 1L bolus of NS in ED. Of note, patient's  passed away yesterday and she has been experiencing anxiety per daughter. She arrives to the ICU stable.     Metabolic encephalopathy, 2/2 UTI +/- hyponatremia  - Agitated and anxious per SNF  - Close neurologic monitoring  - Trend BMP as below  - CT head: no acute findings  - Fall precautions  - Seizure precautions  - PT/OT     Grief disorder, anxiety  - Loss of spouse yesterday  - Continue Zoloft  - Hold PRN lorazepam d/t mental status changes     PULMONOLOGY:   Malignant neoplasm of left lung  - s/p left upper lobe resection  - stable nodule to LLL  - Supplemental O2 as needed to maintain 
Independent/assisted living    Hospice    Other:       PATIENT CONDITION AT DISCHARGE:     Functional status    Poor    x Deconditioned     Independent      Cognition    x Lucid     Forgetful     Dementia      Catheters/lines (plus indication)    Garcias     PICC     PEG    x None      Code status     Full code    x DNR            DISCHARGE MEDICATIONS:     Medication List        START taking these medications      sodium chloride 1 g tablet  Take 1 tablet by mouth 3 times daily (with meals)            CONTINUE taking these medications      ascorbic acid 500 MG tablet  Commonly known as: VITAMIN C     bimatoprost 0.01 % Soln ophthalmic drops  Commonly known as: LUMIGAN     Biotin 2.5 MG Caps     enoxaparin 300 MG/3ML injection  Commonly known as: LOVENOX     ferrous sulfate 325 (65 Fe) MG tablet  Commonly known as: IRON 325     LORazepam 1 MG tablet  Commonly known as: ATIVAN     megestrol 40 MG tablet  Commonly known as: MEGACE     melatonin 3 MG Tabs tablet     methocarbamol 500 MG tablet  Commonly known as: ROBAXIN     nystatin 986231 UNIT/ML suspension  Commonly known as: MYCOSTATIN     omeprazole 20 MG delayed release capsule  Commonly known as: PRILOSEC     oxyBUTYnin 5 MG extended release tablet  Commonly known as: DITROPAN-XL     oxyCODONE 5 MG/5ML solution  Commonly known as: ROXICODONE  Take 2.5 mLs by mouth every 4 hours as needed for Pain for up to 5 days. Give 2.5 ml by mouth every 8 hours Max Daily Amount: 15 mg            STOP taking these medications      acetaZOLAMIDE 500 MG extended release capsule  Commonly known as: DIAMOX     sertraline 25 MG tablet  Commonly known as: ZOLOFT               Where to Get Your Medications        Information about where to get these medications is not yet available    Ask your nurse or doctor about these medications  oxyCODONE 5 MG/5ML solution  sodium chloride 1 g tablet           NOTIFY YOUR PHYSICIAN FOR ANY OF THE FOLLOWING:   Fever over 101 degrees for 24 hours.

## 2024-05-06 ENCOUNTER — TELEPHONE (OUTPATIENT)
Age: 89
End: 2024-05-06

## 2024-05-06 NOTE — TELEPHONE ENCOUNTER
Pt called in stating that she received a new speciality pill in the mail. She was told to contact Dr. Cisneros .    #  [phone]

## 2024-12-07 ENCOUNTER — ANESTHESIA EVENT (OUTPATIENT)
Facility: HOSPITAL | Age: 88
End: 2024-12-07
Payer: MEDICARE

## 2024-12-07 ENCOUNTER — HOSPITAL ENCOUNTER (INPATIENT)
Facility: HOSPITAL | Age: 88
LOS: 3 days | Discharge: NURSING, REHAB OR LONG-TERM CARE | DRG: 521 | End: 2024-12-10
Attending: EMERGENCY MEDICINE | Admitting: INTERNAL MEDICINE
Payer: MEDICARE

## 2024-12-07 ENCOUNTER — ANESTHESIA (OUTPATIENT)
Facility: HOSPITAL | Age: 88
End: 2024-12-07
Payer: MEDICARE

## 2024-12-07 ENCOUNTER — APPOINTMENT (OUTPATIENT)
Facility: HOSPITAL | Age: 88
DRG: 521 | End: 2024-12-07
Payer: MEDICARE
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
## 2024-12-07 DIAGNOSIS — S72.001A CLOSED FRACTURE OF NECK OF RIGHT FEMUR, INITIAL ENCOUNTER (HCC): Primary | ICD-10-CM

## 2024-12-07 LAB
ALBUMIN SERPL-MCNC: 3.3 G/DL (ref 3.5–5)
ALBUMIN/GLOB SERPL: 0.9 (ref 1.1–2.2)
ALP SERPL-CCNC: 64 U/L (ref 45–117)
ALT SERPL-CCNC: 20 U/L (ref 12–78)
ANION GAP SERPL CALC-SCNC: 1 MMOL/L (ref 2–12)
AST SERPL-CCNC: 27 U/L (ref 15–37)
BASOPHILS # BLD: 0 K/UL (ref 0–0.1)
BASOPHILS NFR BLD: 0 % (ref 0–1)
BILIRUB SERPL-MCNC: 0.6 MG/DL (ref 0.2–1)
BUN SERPL-MCNC: 21 MG/DL (ref 6–20)
BUN/CREAT SERPL: 23 (ref 12–20)
CALCIUM SERPL-MCNC: 10.3 MG/DL (ref 8.5–10.1)
CHLORIDE SERPL-SCNC: 105 MMOL/L (ref 97–108)
CK SERPL-CCNC: 54 U/L (ref 26–192)
CO2 SERPL-SCNC: 30 MMOL/L (ref 21–32)
COMMENT:: NORMAL
CREAT SERPL-MCNC: 0.93 MG/DL (ref 0.55–1.02)
DIFFERENTIAL METHOD BLD: ABNORMAL
EKG ATRIAL RATE: 78 BPM
EKG DIAGNOSIS: NORMAL
EKG P AXIS: 84 DEGREES
EKG P-R INTERVAL: 218 MS
EKG Q-T INTERVAL: 340 MS
EKG QRS DURATION: 84 MS
EKG QTC CALCULATION (BAZETT): 387 MS
EKG R AXIS: -21 DEGREES
EKG T AXIS: 78 DEGREES
EKG VENTRICULAR RATE: 78 BPM
EOSINOPHIL # BLD: 0.1 K/UL (ref 0–0.4)
EOSINOPHIL NFR BLD: 1 % (ref 0–7)
ERYTHROCYTE [DISTWIDTH] IN BLOOD BY AUTOMATED COUNT: 13.5 % (ref 11.5–14.5)
GLOBULIN SER CALC-MCNC: 3.6 G/DL (ref 2–4)
GLUCOSE BLD STRIP.AUTO-MCNC: 117 MG/DL (ref 65–117)
GLUCOSE SERPL-MCNC: 110 MG/DL (ref 65–100)
HCT VFR BLD AUTO: 41.5 % (ref 35–47)
HGB BLD-MCNC: 13.7 G/DL (ref 11.5–16)
IMM GRANULOCYTES # BLD AUTO: 0.1 K/UL (ref 0–0.04)
IMM GRANULOCYTES NFR BLD AUTO: 1 % (ref 0–0.5)
INR PPP: 1 (ref 0.9–1.1)
LYMPHOCYTES # BLD: 0.5 K/UL (ref 0.8–3.5)
LYMPHOCYTES NFR BLD: 7 % (ref 12–49)
MCH RBC QN AUTO: 30.9 PG (ref 26–34)
MCHC RBC AUTO-ENTMCNC: 33 G/DL (ref 30–36.5)
MCV RBC AUTO: 93.5 FL (ref 80–99)
MONOCYTES # BLD: 0.3 K/UL (ref 0–1)
MONOCYTES NFR BLD: 5 % (ref 5–13)
NEUTS SEG # BLD: 5.7 K/UL (ref 1.8–8)
NEUTS SEG NFR BLD: 86 % (ref 32–75)
NRBC # BLD: 0 K/UL (ref 0–0.01)
NRBC BLD-RTO: 0 PER 100 WBC
PLATELET # BLD AUTO: 164 K/UL (ref 150–400)
PMV BLD AUTO: 12 FL (ref 8.9–12.9)
POTASSIUM SERPL-SCNC: 4.7 MMOL/L (ref 3.5–5.1)
PROT SERPL-MCNC: 6.9 G/DL (ref 6.4–8.2)
PROTHROMBIN TIME: 10.2 SEC (ref 9–11.1)
RBC # BLD AUTO: 4.44 M/UL (ref 3.8–5.2)
RBC MORPH BLD: ABNORMAL
SERVICE CMNT-IMP: NORMAL
SODIUM SERPL-SCNC: 136 MMOL/L (ref 136–145)
SPECIMEN HOLD: NORMAL
WBC # BLD AUTO: 6.7 K/UL (ref 3.6–11)

## 2024-12-07 PROCEDURE — 2580000003 HC RX 258: Performed by: INTERNAL MEDICINE

## 2024-12-07 PROCEDURE — 2709999900 HC NON-CHARGEABLE SUPPLY: Performed by: ORTHOPAEDIC SURGERY

## 2024-12-07 PROCEDURE — 2580000003 HC RX 258: Performed by: NURSE ANESTHETIST, CERTIFIED REGISTERED

## 2024-12-07 PROCEDURE — 2580000003 HC RX 258: Performed by: ORTHOPAEDIC SURGERY

## 2024-12-07 PROCEDURE — 6360000002 HC RX W HCPCS: Performed by: EMERGENCY MEDICINE

## 2024-12-07 PROCEDURE — 6360000002 HC RX W HCPCS: Performed by: ORTHOPAEDIC SURGERY

## 2024-12-07 PROCEDURE — 96374 THER/PROPH/DIAG INJ IV PUSH: CPT

## 2024-12-07 PROCEDURE — 0SRR0JZ REPLACEMENT OF RIGHT HIP JOINT, FEMORAL SURFACE WITH SYNTHETIC SUBSTITUTE, OPEN APPROACH: ICD-10-PCS | Performed by: ORTHOPAEDIC SURGERY

## 2024-12-07 PROCEDURE — 6360000002 HC RX W HCPCS

## 2024-12-07 PROCEDURE — 93005 ELECTROCARDIOGRAM TRACING: CPT | Performed by: EMERGENCY MEDICINE

## 2024-12-07 PROCEDURE — 6370000000 HC RX 637 (ALT 250 FOR IP): Performed by: INTERNAL MEDICINE

## 2024-12-07 PROCEDURE — 82962 GLUCOSE BLOOD TEST: CPT

## 2024-12-07 PROCEDURE — 51798 US URINE CAPACITY MEASURE: CPT

## 2024-12-07 PROCEDURE — 7100000001 HC PACU RECOVERY - ADDTL 15 MIN: Performed by: ORTHOPAEDIC SURGERY

## 2024-12-07 PROCEDURE — 85610 PROTHROMBIN TIME: CPT

## 2024-12-07 PROCEDURE — 36415 COLL VENOUS BLD VENIPUNCTURE: CPT

## 2024-12-07 PROCEDURE — 2500000003 HC RX 250 WO HCPCS: Performed by: NURSE ANESTHETIST, CERTIFIED REGISTERED

## 2024-12-07 PROCEDURE — 3600000005 HC SURGERY LEVEL 5 BASE: Performed by: ORTHOPAEDIC SURGERY

## 2024-12-07 PROCEDURE — 73502 X-RAY EXAM HIP UNI 2-3 VIEWS: CPT

## 2024-12-07 PROCEDURE — C9290 INJ, BUPIVACAINE LIPOSOME: HCPCS | Performed by: ORTHOPAEDIC SURGERY

## 2024-12-07 PROCEDURE — 82550 ASSAY OF CK (CPK): CPT

## 2024-12-07 PROCEDURE — 6360000002 HC RX W HCPCS: Performed by: NURSE ANESTHETIST, CERTIFIED REGISTERED

## 2024-12-07 PROCEDURE — 85025 COMPLETE CBC W/AUTO DIFF WBC: CPT

## 2024-12-07 PROCEDURE — C1776 JOINT DEVICE (IMPLANTABLE): HCPCS | Performed by: ORTHOPAEDIC SURGERY

## 2024-12-07 PROCEDURE — 99285 EMERGENCY DEPT VISIT HI MDM: CPT

## 2024-12-07 PROCEDURE — 2580000003 HC RX 258: Performed by: STUDENT IN AN ORGANIZED HEALTH CARE EDUCATION/TRAINING PROGRAM

## 2024-12-07 PROCEDURE — 3600000015 HC SURGERY LEVEL 5 ADDTL 15MIN: Performed by: ORTHOPAEDIC SURGERY

## 2024-12-07 PROCEDURE — 80053 COMPREHEN METABOLIC PANEL: CPT

## 2024-12-07 PROCEDURE — 73552 X-RAY EXAM OF FEMUR 2/>: CPT

## 2024-12-07 PROCEDURE — 3700000001 HC ADD 15 MINUTES (ANESTHESIA): Performed by: ORTHOPAEDIC SURGERY

## 2024-12-07 PROCEDURE — 6360000002 HC RX W HCPCS: Performed by: INTERNAL MEDICINE

## 2024-12-07 PROCEDURE — 1100000000 HC RM PRIVATE

## 2024-12-07 PROCEDURE — 99222 1ST HOSP IP/OBS MODERATE 55: CPT | Performed by: PHYSICIAN ASSISTANT

## 2024-12-07 PROCEDURE — 3700000000 HC ANESTHESIA ATTENDED CARE: Performed by: ORTHOPAEDIC SURGERY

## 2024-12-07 PROCEDURE — 7100000000 HC PACU RECOVERY - FIRST 15 MIN: Performed by: ORTHOPAEDIC SURGERY

## 2024-12-07 PROCEDURE — 71045 X-RAY EXAM CHEST 1 VIEW: CPT

## 2024-12-07 DEVICE — ARTICUL/EZE FEMORAL HEAD DIAMETER 28MM +5 12/14 TAPER
Type: IMPLANTABLE DEVICE | Site: HIP | Status: FUNCTIONAL
Brand: ARTICUL/EZE

## 2024-12-07 DEVICE — ACTIS DUOFIX HIP PROSTHESIS (FEMORAL STEM 12/14 TAPER CEMENTLESS SIZE 7 STD COLLAR)  CE
Type: IMPLANTABLE DEVICE | Site: HIP | Status: FUNCTIONAL
Brand: ACTIS

## 2024-12-07 DEVICE — HIP H4 HEMI UNI BIPLR IMPL CAPPED H4: Type: IMPLANTABLE DEVICE | Site: HIP | Status: FUNCTIONAL

## 2024-12-07 DEVICE — SELF CENTERING BI-POLAR HEAD 28MM ID 44MM OD
Type: IMPLANTABLE DEVICE | Site: HIP | Status: FUNCTIONAL
Brand: SELF CENTERING

## 2024-12-07 RX ORDER — POTASSIUM CHLORIDE 7.45 MG/ML
10 INJECTION INTRAVENOUS PRN
Status: CANCELLED | OUTPATIENT
Start: 2024-12-07

## 2024-12-07 RX ORDER — ROCURONIUM BROMIDE 10 MG/ML
INJECTION, SOLUTION INTRAVENOUS
Status: DISCONTINUED | OUTPATIENT
Start: 2024-12-07 | End: 2024-12-07 | Stop reason: SDUPTHER

## 2024-12-07 RX ORDER — TRANEXAMIC ACID 100 MG/ML
INJECTION, SOLUTION INTRAVENOUS
Status: DISCONTINUED | OUTPATIENT
Start: 2024-12-07 | End: 2024-12-07 | Stop reason: SDUPTHER

## 2024-12-07 RX ORDER — SODIUM CHLORIDE 0.9 % (FLUSH) 0.9 %
5-40 SYRINGE (ML) INJECTION PRN
Status: DISCONTINUED | OUTPATIENT
Start: 2024-12-07 | End: 2024-12-10 | Stop reason: HOSPADM

## 2024-12-07 RX ORDER — LABETALOL HYDROCHLORIDE 5 MG/ML
10 INJECTION, SOLUTION INTRAVENOUS
Status: DISCONTINUED | OUTPATIENT
Start: 2024-12-07 | End: 2024-12-07 | Stop reason: HOSPADM

## 2024-12-07 RX ORDER — BISACODYL 5 MG/1
5 TABLET, DELAYED RELEASE ORAL DAILY PRN
Status: DISCONTINUED | OUTPATIENT
Start: 2024-12-07 | End: 2024-12-10 | Stop reason: HOSPADM

## 2024-12-07 RX ORDER — FENTANYL CITRATE 50 UG/ML
50 INJECTION, SOLUTION INTRAMUSCULAR; INTRAVENOUS
Status: DISCONTINUED | OUTPATIENT
Start: 2024-12-07 | End: 2024-12-07 | Stop reason: HOSPADM

## 2024-12-07 RX ORDER — HYDROMORPHONE HYDROCHLORIDE 1 MG/ML
0.5 INJECTION, SOLUTION INTRAMUSCULAR; INTRAVENOUS; SUBCUTANEOUS EVERY 4 HOURS PRN
Status: DISCONTINUED | OUTPATIENT
Start: 2024-12-07 | End: 2024-12-10 | Stop reason: HOSPADM

## 2024-12-07 RX ORDER — LIDOCAINE HYDROCHLORIDE 10 MG/ML
1 INJECTION, SOLUTION EPIDURAL; INFILTRATION; INTRACAUDAL; PERINEURAL
Status: DISCONTINUED | OUTPATIENT
Start: 2024-12-07 | End: 2024-12-07 | Stop reason: HOSPADM

## 2024-12-07 RX ORDER — SODIUM CHLORIDE 0.9 % (FLUSH) 0.9 %
5-40 SYRINGE (ML) INJECTION EVERY 12 HOURS SCHEDULED
Status: DISCONTINUED | OUTPATIENT
Start: 2024-12-07 | End: 2024-12-10 | Stop reason: HOSPADM

## 2024-12-07 RX ORDER — LATANOPROST 50 UG/ML
1 SOLUTION/ DROPS OPHTHALMIC NIGHTLY
Status: DISCONTINUED | OUTPATIENT
Start: 2024-12-07 | End: 2024-12-10 | Stop reason: HOSPADM

## 2024-12-07 RX ORDER — SPIRONOLACTONE 25 MG/1
25 TABLET ORAL DAILY
Status: DISCONTINUED | OUTPATIENT
Start: 2024-12-07 | End: 2024-12-10 | Stop reason: HOSPADM

## 2024-12-07 RX ORDER — LORAZEPAM 1 MG/1
1 TABLET ORAL 2 TIMES DAILY PRN
Status: DISCONTINUED | OUTPATIENT
Start: 2024-12-07 | End: 2024-12-10 | Stop reason: HOSPADM

## 2024-12-07 RX ORDER — ONDANSETRON 2 MG/ML
4 INJECTION INTRAMUSCULAR; INTRAVENOUS
Status: DISCONTINUED | OUTPATIENT
Start: 2024-12-07 | End: 2024-12-07 | Stop reason: HOSPADM

## 2024-12-07 RX ORDER — DEXAMETHASONE SODIUM PHOSPHATE 4 MG/ML
INJECTION, SOLUTION INTRA-ARTICULAR; INTRALESIONAL; INTRAMUSCULAR; INTRAVENOUS; SOFT TISSUE
Status: DISCONTINUED | OUTPATIENT
Start: 2024-12-07 | End: 2024-12-07 | Stop reason: SDUPTHER

## 2024-12-07 RX ORDER — SODIUM CHLORIDE 9 MG/ML
INJECTION, SOLUTION INTRAVENOUS PRN
Status: DISCONTINUED | OUTPATIENT
Start: 2024-12-07 | End: 2024-12-07 | Stop reason: HOSPADM

## 2024-12-07 RX ORDER — SODIUM CHLORIDE 0.9 % (FLUSH) 0.9 %
5-40 SYRINGE (ML) INJECTION PRN
Status: DISCONTINUED | OUTPATIENT
Start: 2024-12-07 | End: 2024-12-07 | Stop reason: HOSPADM

## 2024-12-07 RX ORDER — NALOXONE HYDROCHLORIDE 0.4 MG/ML
0.4 INJECTION, SOLUTION INTRAMUSCULAR; INTRAVENOUS; SUBCUTANEOUS PRN
Status: DISCONTINUED | OUTPATIENT
Start: 2024-12-07 | End: 2024-12-10 | Stop reason: HOSPADM

## 2024-12-07 RX ORDER — SODIUM CHLORIDE, SODIUM LACTATE, POTASSIUM CHLORIDE, CALCIUM CHLORIDE 600; 310; 30; 20 MG/100ML; MG/100ML; MG/100ML; MG/100ML
INJECTION, SOLUTION INTRAVENOUS CONTINUOUS
Status: DISCONTINUED | OUTPATIENT
Start: 2024-12-07 | End: 2024-12-07 | Stop reason: HOSPADM

## 2024-12-07 RX ORDER — SPIRONOLACTONE 25 MG/1
25 TABLET ORAL DAILY
COMMUNITY

## 2024-12-07 RX ORDER — SPIRONOLACTONE 25 MG/1
25 TABLET ORAL DAILY
Status: DISCONTINUED | OUTPATIENT
Start: 2024-12-07 | End: 2024-12-07

## 2024-12-07 RX ORDER — OXYCODONE HYDROCHLORIDE 5 MG/1
10 TABLET ORAL PRN
Status: DISCONTINUED | OUTPATIENT
Start: 2024-12-07 | End: 2024-12-07

## 2024-12-07 RX ORDER — HYDROMORPHONE HYDROCHLORIDE 1 MG/ML
1 INJECTION, SOLUTION INTRAMUSCULAR; INTRAVENOUS; SUBCUTANEOUS ONCE
Status: COMPLETED | OUTPATIENT
Start: 2024-12-07 | End: 2024-12-07

## 2024-12-07 RX ORDER — SODIUM CHLORIDE 9 MG/ML
INJECTION, SOLUTION INTRAVENOUS PRN
Status: DISCONTINUED | OUTPATIENT
Start: 2024-12-07 | End: 2024-12-10 | Stop reason: HOSPADM

## 2024-12-07 RX ORDER — POLYETHYLENE GLYCOL 3350 17 G/17G
17 POWDER, FOR SOLUTION ORAL DAILY PRN
Status: DISCONTINUED | OUTPATIENT
Start: 2024-12-07 | End: 2024-12-10 | Stop reason: HOSPADM

## 2024-12-07 RX ORDER — FENTANYL CITRATE 50 UG/ML
INJECTION, SOLUTION INTRAMUSCULAR; INTRAVENOUS
Status: DISCONTINUED | OUTPATIENT
Start: 2024-12-07 | End: 2024-12-07 | Stop reason: SDUPTHER

## 2024-12-07 RX ORDER — HYDROMORPHONE HYDROCHLORIDE 1 MG/ML
0.25 INJECTION, SOLUTION INTRAMUSCULAR; INTRAVENOUS; SUBCUTANEOUS EVERY 5 MIN PRN
Status: DISCONTINUED | OUTPATIENT
Start: 2024-12-07 | End: 2024-12-07 | Stop reason: HOSPADM

## 2024-12-07 RX ORDER — CEFAZOLIN SODIUM 1 G/3ML
INJECTION, POWDER, FOR SOLUTION INTRAMUSCULAR; INTRAVENOUS
Status: DISCONTINUED | OUTPATIENT
Start: 2024-12-07 | End: 2024-12-07 | Stop reason: SDUPTHER

## 2024-12-07 RX ORDER — OXYCODONE HYDROCHLORIDE 5 MG/1
5 TABLET ORAL PRN
Status: DISCONTINUED | OUTPATIENT
Start: 2024-12-07 | End: 2024-12-07 | Stop reason: HOSPADM

## 2024-12-07 RX ORDER — HYDROMORPHONE HYDROCHLORIDE 1 MG/ML
0.5 INJECTION, SOLUTION INTRAMUSCULAR; INTRAVENOUS; SUBCUTANEOUS
Status: DISCONTINUED | OUTPATIENT
Start: 2024-12-07 | End: 2024-12-07

## 2024-12-07 RX ORDER — LIDOCAINE HYDROCHLORIDE 20 MG/ML
INJECTION, SOLUTION EPIDURAL; INFILTRATION; INTRACAUDAL; PERINEURAL
Status: DISCONTINUED | OUTPATIENT
Start: 2024-12-07 | End: 2024-12-07 | Stop reason: SDUPTHER

## 2024-12-07 RX ORDER — MIDAZOLAM HYDROCHLORIDE 2 MG/2ML
2 INJECTION, SOLUTION INTRAMUSCULAR; INTRAVENOUS
Status: DISCONTINUED | OUTPATIENT
Start: 2024-12-07 | End: 2024-12-07 | Stop reason: HOSPADM

## 2024-12-07 RX ORDER — MAGNESIUM SULFATE IN WATER 40 MG/ML
2000 INJECTION, SOLUTION INTRAVENOUS PRN
Status: CANCELLED | OUTPATIENT
Start: 2024-12-07

## 2024-12-07 RX ORDER — LORAZEPAM 2 MG/ML
0.5 INJECTION INTRAMUSCULAR
Status: DISCONTINUED | OUTPATIENT
Start: 2024-12-07 | End: 2024-12-07 | Stop reason: HOSPADM

## 2024-12-07 RX ORDER — HYDRALAZINE HYDROCHLORIDE 20 MG/ML
10 INJECTION INTRAMUSCULAR; INTRAVENOUS
Status: DISCONTINUED | OUTPATIENT
Start: 2024-12-07 | End: 2024-12-07 | Stop reason: HOSPADM

## 2024-12-07 RX ORDER — FENTANYL CITRATE 50 UG/ML
50 INJECTION, SOLUTION INTRAMUSCULAR; INTRAVENOUS EVERY 5 MIN PRN
Status: DISCONTINUED | OUTPATIENT
Start: 2024-12-07 | End: 2024-12-07 | Stop reason: HOSPADM

## 2024-12-07 RX ORDER — PHENYLEPHRINE HCL IN 0.9% NACL 0.4MG/10ML
SYRINGE (ML) INTRAVENOUS
Status: DISCONTINUED | OUTPATIENT
Start: 2024-12-07 | End: 2024-12-07 | Stop reason: SDUPTHER

## 2024-12-07 RX ORDER — DEXTROSE MONOHYDRATE AND SODIUM CHLORIDE 5; .45 G/100ML; G/100ML
INJECTION, SOLUTION INTRAVENOUS CONTINUOUS
Status: DISCONTINUED | OUTPATIENT
Start: 2024-12-07 | End: 2024-12-08

## 2024-12-07 RX ORDER — ACETAMINOPHEN 325 MG/1
650 TABLET ORAL EVERY 6 HOURS
Status: DISCONTINUED | OUTPATIENT
Start: 2024-12-07 | End: 2024-12-10 | Stop reason: HOSPADM

## 2024-12-07 RX ORDER — OXYCODONE HYDROCHLORIDE 5 MG/1
5 TABLET ORAL PRN
Status: DISCONTINUED | OUTPATIENT
Start: 2024-12-07 | End: 2024-12-07

## 2024-12-07 RX ORDER — MORPHINE SULFATE 4 MG/ML
4 INJECTION, SOLUTION INTRAMUSCULAR; INTRAVENOUS
Status: DISCONTINUED | OUTPATIENT
Start: 2024-12-07 | End: 2024-12-07

## 2024-12-07 RX ORDER — FENTANYL CITRATE 50 UG/ML
INJECTION, SOLUTION INTRAMUSCULAR; INTRAVENOUS
Status: COMPLETED
Start: 2024-12-07 | End: 2024-12-07

## 2024-12-07 RX ORDER — ENOXAPARIN SODIUM 100 MG/ML
30 INJECTION SUBCUTANEOUS DAILY
Status: DISCONTINUED | OUTPATIENT
Start: 2024-12-08 | End: 2024-12-10 | Stop reason: HOSPADM

## 2024-12-07 RX ORDER — ONDANSETRON 2 MG/ML
4 INJECTION INTRAMUSCULAR; INTRAVENOUS EVERY 6 HOURS PRN
Status: DISCONTINUED | OUTPATIENT
Start: 2024-12-07 | End: 2024-12-10 | Stop reason: HOSPADM

## 2024-12-07 RX ORDER — FENTANYL CITRATE 50 UG/ML
50 INJECTION, SOLUTION INTRAMUSCULAR; INTRAVENOUS
Status: COMPLETED | OUTPATIENT
Start: 2024-12-07 | End: 2024-12-07

## 2024-12-07 RX ORDER — NALOXONE HYDROCHLORIDE 0.4 MG/ML
INJECTION, SOLUTION INTRAMUSCULAR; INTRAVENOUS; SUBCUTANEOUS PRN
Status: DISCONTINUED | OUTPATIENT
Start: 2024-12-07 | End: 2024-12-07 | Stop reason: HOSPADM

## 2024-12-07 RX ORDER — POTASSIUM CHLORIDE 750 MG/1
40 TABLET, EXTENDED RELEASE ORAL PRN
Status: CANCELLED | OUTPATIENT
Start: 2024-12-07

## 2024-12-07 RX ORDER — ONDANSETRON 2 MG/ML
INJECTION INTRAMUSCULAR; INTRAVENOUS
Status: DISCONTINUED | OUTPATIENT
Start: 2024-12-07 | End: 2024-12-07 | Stop reason: SDUPTHER

## 2024-12-07 RX ORDER — MAGNESIUM HYDROXIDE/ALUMINUM HYDROXICE/SIMETHICONE 120; 1200; 1200 MG/30ML; MG/30ML; MG/30ML
15 SUSPENSION ORAL EVERY 6 HOURS PRN
Status: DISCONTINUED | OUTPATIENT
Start: 2024-12-07 | End: 2024-12-10 | Stop reason: HOSPADM

## 2024-12-07 RX ORDER — SODIUM CHLORIDE 0.9 % (FLUSH) 0.9 %
5-40 SYRINGE (ML) INJECTION EVERY 12 HOURS SCHEDULED
Status: DISCONTINUED | OUTPATIENT
Start: 2024-12-07 | End: 2024-12-07 | Stop reason: HOSPADM

## 2024-12-07 RX ORDER — OXYCODONE HYDROCHLORIDE 5 MG/1
2.5 TABLET ORAL PRN
Status: DISCONTINUED | OUTPATIENT
Start: 2024-12-07 | End: 2024-12-07 | Stop reason: HOSPADM

## 2024-12-07 RX ORDER — PROCHLORPERAZINE EDISYLATE 5 MG/ML
5 INJECTION INTRAMUSCULAR; INTRAVENOUS
Status: DISCONTINUED | OUTPATIENT
Start: 2024-12-07 | End: 2024-12-07 | Stop reason: HOSPADM

## 2024-12-07 RX ORDER — ALBUTEROL SULFATE 0.83 MG/ML
2.5 SOLUTION RESPIRATORY (INHALATION)
Status: DISCONTINUED | OUTPATIENT
Start: 2024-12-07 | End: 2024-12-07 | Stop reason: HOSPADM

## 2024-12-07 RX ORDER — SODIUM CHLORIDE, SODIUM LACTATE, POTASSIUM CHLORIDE, CALCIUM CHLORIDE 600; 310; 30; 20 MG/100ML; MG/100ML; MG/100ML; MG/100ML
INJECTION, SOLUTION INTRAVENOUS
Status: DISCONTINUED | OUTPATIENT
Start: 2024-12-07 | End: 2024-12-07 | Stop reason: SDUPTHER

## 2024-12-07 RX ORDER — FENTANYL CITRATE 50 UG/ML
25 INJECTION, SOLUTION INTRAMUSCULAR; INTRAVENOUS
Status: DISCONTINUED | OUTPATIENT
Start: 2024-12-07 | End: 2024-12-07 | Stop reason: HOSPADM

## 2024-12-07 RX ORDER — ONDANSETRON 4 MG/1
4 TABLET, ORALLY DISINTEGRATING ORAL EVERY 8 HOURS PRN
Status: CANCELLED | OUTPATIENT
Start: 2024-12-07

## 2024-12-07 RX ORDER — PROPOFOL 10 MG/ML
INJECTION, EMULSION INTRAVENOUS
Status: DISCONTINUED | OUTPATIENT
Start: 2024-12-07 | End: 2024-12-07 | Stop reason: SDUPTHER

## 2024-12-07 RX ADMIN — FENTANYL CITRATE 50 MCG: 50 INJECTION, SOLUTION INTRAMUSCULAR; INTRAVENOUS at 20:32

## 2024-12-07 RX ADMIN — SPIRONOLACTONE 25 MG: 25 TABLET ORAL at 15:08

## 2024-12-07 RX ADMIN — CEFAZOLIN 1 G: 1 INJECTION, POWDER, FOR SOLUTION INTRAMUSCULAR; INTRAVENOUS at 20:35

## 2024-12-07 RX ADMIN — PHENYLEPHRINE HYDROCHLORIDE 50 MCG/MIN: 10 INJECTION INTRAVENOUS at 20:24

## 2024-12-07 RX ADMIN — LIDOCAINE HYDROCHLORIDE 40 MG: 20 INJECTION, SOLUTION EPIDURAL; INFILTRATION; INTRACAUDAL; PERINEURAL at 20:24

## 2024-12-07 RX ADMIN — FENTANYL CITRATE 50 MCG: 50 INJECTION, SOLUTION INTRAMUSCULAR; INTRAVENOUS at 21:15

## 2024-12-07 RX ADMIN — SODIUM CHLORIDE, PRESERVATIVE FREE 5 ML: 5 INJECTION INTRAVENOUS at 19:59

## 2024-12-07 RX ADMIN — ROCURONIUM BROMIDE 30 MG: 10 INJECTION, SOLUTION INTRAVENOUS at 20:24

## 2024-12-07 RX ADMIN — HYDROMORPHONE HYDROCHLORIDE 0.5 MG: 1 INJECTION, SOLUTION INTRAMUSCULAR; INTRAVENOUS; SUBCUTANEOUS at 16:04

## 2024-12-07 RX ADMIN — HYDROMORPHONE HYDROCHLORIDE 1 MG: 1 INJECTION, SOLUTION INTRAMUSCULAR; INTRAVENOUS; SUBCUTANEOUS at 12:36

## 2024-12-07 RX ADMIN — SODIUM CHLORIDE, PRESERVATIVE FREE 5 ML: 5 INJECTION INTRAVENOUS at 23:12

## 2024-12-07 RX ADMIN — Medication 40 MCG: at 20:24

## 2024-12-07 RX ADMIN — SODIUM CHLORIDE, POTASSIUM CHLORIDE, SODIUM LACTATE AND CALCIUM CHLORIDE: 600; 310; 30; 20 INJECTION, SOLUTION INTRAVENOUS at 20:10

## 2024-12-07 RX ADMIN — DEXTROSE AND SODIUM CHLORIDE: 5; 450 INJECTION, SOLUTION INTRAVENOUS at 16:29

## 2024-12-07 RX ADMIN — Medication 5 ML: at 19:59

## 2024-12-07 RX ADMIN — FENTANYL CITRATE 50 MCG: 50 INJECTION INTRAMUSCULAR; INTRAVENOUS at 10:18

## 2024-12-07 RX ADMIN — DEXAMETHASONE SODIUM PHOSPHATE 4 MG: 4 INJECTION INTRA-ARTICULAR; INTRALESIONAL; INTRAMUSCULAR; INTRAVENOUS; SOFT TISSUE at 20:36

## 2024-12-07 RX ADMIN — PROPOFOL 50 MG: 10 INJECTION, EMULSION INTRAVENOUS at 20:24

## 2024-12-07 RX ADMIN — LORAZEPAM 1 MG: 1 TABLET ORAL at 15:08

## 2024-12-07 RX ADMIN — SODIUM CHLORIDE, PRESERVATIVE FREE 10 ML: 5 INJECTION INTRAVENOUS at 19:59

## 2024-12-07 RX ADMIN — FENTANYL CITRATE 50 MCG: 50 INJECTION, SOLUTION INTRAMUSCULAR; INTRAVENOUS at 19:58

## 2024-12-07 RX ADMIN — TRANEXAMIC ACID 500 MG: 100 INJECTION, SOLUTION INTRAVENOUS at 20:36

## 2024-12-07 RX ADMIN — ONDANSETRON 4 MG: 2 INJECTION INTRAMUSCULAR; INTRAVENOUS at 20:36

## 2024-12-07 ASSESSMENT — PAIN DESCRIPTION - ORIENTATION
ORIENTATION: RIGHT

## 2024-12-07 ASSESSMENT — PAIN SCALES - GENERAL
PAINLEVEL_OUTOF10: 10

## 2024-12-07 ASSESSMENT — PAIN - FUNCTIONAL ASSESSMENT
PAIN_FUNCTIONAL_ASSESSMENT: PREVENTS OR INTERFERES WITH MANY ACTIVE NOT PASSIVE ACTIVITIES
PAIN_FUNCTIONAL_ASSESSMENT: 0-10
PAIN_FUNCTIONAL_ASSESSMENT: NONE - DENIES PAIN
PAIN_FUNCTIONAL_ASSESSMENT: 0-10
PAIN_FUNCTIONAL_ASSESSMENT: NONE - DENIES PAIN
PAIN_FUNCTIONAL_ASSESSMENT: PREVENTS OR INTERFERES SOME ACTIVE ACTIVITIES AND ADLS
PAIN_FUNCTIONAL_ASSESSMENT: PREVENTS OR INTERFERES WITH MANY ACTIVE NOT PASSIVE ACTIVITIES
PAIN_FUNCTIONAL_ASSESSMENT: ADULT NONVERBAL PAIN SCALE (NPVS)

## 2024-12-07 ASSESSMENT — PAIN DESCRIPTION - DESCRIPTORS
DESCRIPTORS: SORE
DESCRIPTORS: ACHING
DESCRIPTORS: SHARP
DESCRIPTORS: SHARP
DESCRIPTORS: ACHING;SHOOTING;SHARP

## 2024-12-07 ASSESSMENT — LIFESTYLE VARIABLES: SMOKING_STATUS: 0

## 2024-12-07 ASSESSMENT — PAIN DESCRIPTION - PAIN TYPE: TYPE: ACUTE PAIN

## 2024-12-07 ASSESSMENT — PAIN DESCRIPTION - LOCATION
LOCATION: HIP
LOCATION: HIP
LOCATION: LEG;HIP
LOCATION: HIP;LEG

## 2024-12-07 NOTE — CONSULTS
ORTHOPEDIC SURGERY CONSULT    Subjective:     Date of Consultation:  2024    Lesly Ybarra is a 89 y.o. female who is being seen for right hip pain. Injury occurred this morning when she fell trying to get out of bed. She lives with her daughter who is with her today in the ER. Workup has revealed a right femoral neck fracture. Pt. Denies head trauma/LOC during injury. Orthopedic surgery has been consulted by Dr. Lopez at the [facility] for evaluation and management of her acute right hip fracture.    Patient Active Problem List    Diagnosis Date Noted    Closed fracture of neck of right femur, initial encounter ([facility]) 2024    DNR (do not resuscitate) 2024    Goals of care, counseling/discussion 2024    Pain of left hip 2024    Palliative care by specialist 2024    Primary hypertension 2024    Hyponatremia 2024    Anxiety and depression 2024    Anemia 2024    Bladder spasm 2024    Closed fracture of left hip ([facility]) 2024    Malignant neoplasm of left lung ([facility]) 2021    Malignant neoplasm of urinary bladder ([facility]) 2021    Compression fracture of L2 ([facility]) 04/15/2014     Family History   Problem Relation Age of Onset    Cancer Father       Social History     Tobacco Use    Smoking status: Former     Current packs/day: 0.00     Types: Cigarettes     Quit date: 4/15/1984     Years since quittin.6    Smokeless tobacco: Never   Substance Use Topics    Alcohol use: No     Past Medical History:   Diagnosis Date    Cancer ([facility])     lymphoma    Hypertension       Past Surgical History:   Procedure Laterality Date    HEENT      tonsillectomy    ORTHOPEDIC SURGERY  2014    lumbar fracture      Prior to Admission medications    Medication Sig Start Date End Date Taking? Authorizing Provider   sodium chloride 1 g tablet Take 1 tablet by mouth 3 times daily (with meals) 3/6/24   Jaylon Jason MD   ascorbic acid (VITAMIN C)

## 2024-12-07 NOTE — ED NOTES
ED TO INPATIENT SBAR HANDOFF    Patient Name: Lesly Ybarra   :  1935  89 y.o.   MRN:  [id number]  ED Room #:  ER08/08     Situation  Code Status: DNR   Allergies: Amlodipine, Diphenhydramine, and Lisinopril  Weight: Patient Vitals for the past 96 hrs (Last 3 readings):   Weight   24 0932 47.9 kg (105 lb 9.6 oz)       Arrived from: home    Chief Complaint:   Chief Complaint   Patient presents with    Leg Pain       Hospital Problem/Diagnosis:  Principal Problem:    Closed fracture of neck of right femur, initial encounter ([facility])  Resolved Problems:    * No resolved hospital problems. *      Mobility: limited bed mobility  and a recent fall   ED Fall Risk: Presents to emergency department  because of falls (Syncope, seizure, or loss of consciousness): Yes, Age > 70: Yes, Altered Mental Status, Intoxication with alcohol or substance confusion (Disorientation, impaired judgment, poor safety awaremess, or inability to follow instructions): No, Impaired Mobility: Ambulates or transfers with assistive devices or assistance; Unable to ambulate or transer.: Yes, Nursing Judgement: Yes   Fell in ED or prior to admission: yes   Restraints: no     Sitter: no   Family/Caregiver Present: yes    Neet to know social/safety information: painful with movement in bed    Background  History:   Past Medical History:   Diagnosis Date    Cancer ([facility])     lymphoma    Hypertension        Assessment    Abnormal Assessment Findings: impaired hearing, limited movement of R leg, pain to R leg  Imaging:   XR HIP 2-3 VW W PELVIS RIGHT   Final Result      1.     Acute impacted superiorly displaced right femoral neck fracture.   2.  Chronic appearing internally fixed left femoral neck fracture without   definite osseous union. Cannot exclude AN unstable deformity.           Electronically signed by TOÑITO WHEELER      XR FEMUR RIGHT (MIN 2 VIEWS)   Final Result      1.     Acute impacted superiorly displaced right femoral neck

## 2024-12-07 NOTE — H&P
History and Physical    Date of Service:  12/7/2024  Primary Care Provider: Shaneka Benitez MD  Source of information: The patient, Chart review, and Spouse/family member    Chief Complaint: Leg Pain      History of Presenting Illness:   Lesly Ybarra is a 89 y.o. female with systemic sclerosis ([facility]), Raynaud phenomenon, telangiectasias, GERD, urinary bladder CA on BCG and IL ([facility]), h/o left lung adenocarcinoma s/p wedge resection in remission ([facility], 2020), HTN, h/o lymphoma, R frontoparietal brain mass, s/p L femoral neck fracture s/p internal fixation who was BIBEMS with R hip pain s/p fall out of bed this morning. She denied head trauma and LOC. Daughter is at bedside. XR R hip showed acute impacted superiorly displaced right femoral neck fracture and chronic appearing internally fixed L femoral neck fx without definite osseous union. Ortho was consulted. Pt is crying out in pain.      REVIEW OF SYSTEMS:  Pertinent items are noted in the History of Present Illness.     Past Medical History:   Diagnosis Date    Cancer ([facility]) 1994    lymphoma    Hypertension       Past Surgical History:   Procedure Laterality Date    HEENT      tonsillectomy    ORTHOPEDIC SURGERY  4/2014    lumbar fracture     Prior to Admission medications    Medication Sig Start Date End Date Taking? Authorizing Provider   sodium chloride 1 g tablet Take 1 tablet by mouth 3 times daily (with meals) 3/6/24   Jaylon Jason MD   ascorbic acid (VITAMIN C) 500 MG tablet Take 1 tablet by mouth daily    Titus Mederos MD   enoxaparin (LOVENOX) 300 MG/3ML injection Inject into the skin 2 times daily    Titus Mederos MD   ferrous sulfate (IRON 325) 325 (65 Fe) MG tablet Take 1 tablet by mouth daily (with breakfast)    Titus Mederos MD   LORazepam (ATIVAN) 1 MG tablet Take 1 tablet by mouth 2 times daily as needed for Anxiety. Max Daily Amount: 2 mg    Titus Mederos

## 2024-12-07 NOTE — ED PROVIDER NOTES
[facility] EMERGENCY DEP  EMERGENCY DEPARTMENT ENCOUNTER      Pt Name: Lesly Ybarra  MRN: [id number]  Birthdate 1935  Date of evaluation: 12/7/2024  Provider: Abran Lopez DO      HISTORY OF PRESENT ILLNESS      HPI    89-year-old female presents to the emerged department by EMS from home after she reportedly fell out of bed this morning.  She has a history of prior hip fracture in the left and expressed concern for the same at this time on the right.  No head injury or LOC.  She is on no blood thinners.  Denies any other injuries.    Nursing Notes were reviewed.    REVIEW OF SYSTEMS         Review of Systems        PAST MEDICAL HISTORY     Past Medical History:   Diagnosis Date    Cancer ([facility]) 1994    lymphoma    Hypertension          SURGICAL HISTORY       Past Surgical History:   Procedure Laterality Date    HEENT      tonsillectomy    ORTHOPEDIC SURGERY  4/2014    lumbar fracture         CURRENT MEDICATIONS       Previous Medications    ASCORBIC ACID (VITAMIN C) 500 MG TABLET    Take 1 tablet by mouth daily    BIMATOPROST (LUMIGAN) 0.01 % SOLN OPHTHALMIC DROPS    Apply 1 drop to eye every evening    BIOTIN 2.5 MG CAPS    Take by mouth    ENOXAPARIN (LOVENOX) 300 MG/3ML INJECTION    Inject into the skin 2 times daily    FERROUS SULFATE (IRON 325) 325 (65 FE) MG TABLET    Take 1 tablet by mouth daily (with breakfast)    LORAZEPAM (ATIVAN) 1 MG TABLET    Take 1 tablet by mouth 2 times daily as needed for Anxiety. Max Daily Amount: 2 mg    MEGESTROL (MEGACE) 40 MG TABLET    Take 1 tablet by mouth daily    MELATONIN 3 MG TABS TABLET    Take 1 tablet by mouth daily    METHOCARBAMOL (ROBAXIN) 500 MG TABLET    Take 1 tablet by mouth 4 times daily    NYSTATIN (MYCOSTATIN) 182878 UNIT/ML SUSPENSION    Take 1 mL by mouth 4 times daily Give 5 ml for oral thrush;    OMEPRAZOLE (PRILOSEC) 20 MG DELAYED RELEASE CAPSULE    Take 25 mg by mouth daily    OXYBUTYNIN (DITROPAN-XL) 5 MG EXTENDED RELEASE TABLET    Take 1 tablet by

## 2024-12-07 NOTE — ED TRIAGE NOTES
Pt arrives from home via EMS for right leg pain in hip area. On prednisone and hx of HTN.  Uses a rollator. Lives with daughter.

## 2024-12-07 NOTE — PERIOP NOTE
TRANSFER - IN REPORT:    Verbal report received from [facility] RN(name) on Lesly Ybarra  being received from 577(unit) for ordered procedure      Report consisted of patient’s Situation, Background, Assessment and   Recommendations(SBAR).     Information from the following report(s) MAR and Procedure Verification was reviewed with the receiving nurse.    Opportunity for questions and clarification was provided.      Assessment completed upon patient’s arrival to unit and care assume

## 2024-12-08 LAB
25(OH)D3 SERPL-MCNC: 53.5 NG/ML (ref 30–100)
ANION GAP SERPL CALC-SCNC: 6 MMOL/L (ref 2–12)
BASOPHILS # BLD: 0 K/UL (ref 0–0.1)
BASOPHILS NFR BLD: 0 % (ref 0–1)
BUN SERPL-MCNC: 19 MG/DL (ref 6–20)
BUN/CREAT SERPL: 24 (ref 12–20)
CALCIUM SERPL-MCNC: 9.3 MG/DL (ref 8.5–10.1)
CHLORIDE SERPL-SCNC: 106 MMOL/L (ref 97–108)
CO2 SERPL-SCNC: 22 MMOL/L (ref 21–32)
CREAT SERPL-MCNC: 0.78 MG/DL (ref 0.55–1.02)
DIFFERENTIAL METHOD BLD: ABNORMAL
EOSINOPHIL # BLD: 0 K/UL (ref 0–0.4)
EOSINOPHIL NFR BLD: 0 % (ref 0–7)
ERYTHROCYTE [DISTWIDTH] IN BLOOD BY AUTOMATED COUNT: 13.2 % (ref 11.5–14.5)
GLUCOSE BLD STRIP.AUTO-MCNC: 121 MG/DL (ref 65–117)
GLUCOSE SERPL-MCNC: 139 MG/DL (ref 65–100)
HCT VFR BLD AUTO: 36.2 % (ref 35–47)
HGB BLD-MCNC: 11.6 G/DL (ref 11.5–16)
IMM GRANULOCYTES # BLD AUTO: 0 K/UL (ref 0–0.04)
IMM GRANULOCYTES NFR BLD AUTO: 0 % (ref 0–0.5)
LYMPHOCYTES # BLD: 0.3 K/UL (ref 0.8–3.5)
LYMPHOCYTES NFR BLD: 3 % (ref 12–49)
MCH RBC QN AUTO: 31.3 PG (ref 26–34)
MCHC RBC AUTO-ENTMCNC: 32 G/DL (ref 30–36.5)
MCV RBC AUTO: 97.6 FL (ref 80–99)
MONOCYTES # BLD: 1.2 K/UL (ref 0–1)
MONOCYTES NFR BLD: 11 % (ref 5–13)
NEUTS SEG # BLD: 9.7 K/UL (ref 1.8–8)
NEUTS SEG NFR BLD: 86 % (ref 32–75)
NRBC # BLD: 0 K/UL (ref 0–0.01)
NRBC BLD-RTO: 0 PER 100 WBC
PLATELET # BLD AUTO: 129 K/UL (ref 150–400)
POTASSIUM SERPL-SCNC: 4.6 MMOL/L (ref 3.5–5.1)
RBC # BLD AUTO: 3.71 M/UL (ref 3.8–5.2)
RBC MORPH BLD: ABNORMAL
SERVICE CMNT-IMP: ABNORMAL
SODIUM SERPL-SCNC: 134 MMOL/L (ref 136–145)
WBC # BLD AUTO: 11.2 K/UL (ref 3.6–11)

## 2024-12-08 PROCEDURE — 6360000002 HC RX W HCPCS: Performed by: ORTHOPAEDIC SURGERY

## 2024-12-08 PROCEDURE — 6370000000 HC RX 637 (ALT 250 FOR IP): Performed by: INTERNAL MEDICINE

## 2024-12-08 PROCEDURE — 85025 COMPLETE CBC W/AUTO DIFF WBC: CPT

## 2024-12-08 PROCEDURE — 82306 VITAMIN D 25 HYDROXY: CPT

## 2024-12-08 PROCEDURE — 1100000000 HC RM PRIVATE

## 2024-12-08 PROCEDURE — 99024 POSTOP FOLLOW-UP VISIT: CPT | Performed by: PHYSICIAN ASSISTANT

## 2024-12-08 PROCEDURE — 6360000002 HC RX W HCPCS: Performed by: INTERNAL MEDICINE

## 2024-12-08 PROCEDURE — 80048 BASIC METABOLIC PNL TOTAL CA: CPT

## 2024-12-08 PROCEDURE — 2580000003 HC RX 258: Performed by: ORTHOPAEDIC SURGERY

## 2024-12-08 PROCEDURE — 6370000000 HC RX 637 (ALT 250 FOR IP): Performed by: HOSPITALIST

## 2024-12-08 PROCEDURE — 82962 GLUCOSE BLOOD TEST: CPT

## 2024-12-08 PROCEDURE — 2700000000 HC OXYGEN THERAPY PER DAY

## 2024-12-08 PROCEDURE — 6370000000 HC RX 637 (ALT 250 FOR IP): Performed by: ORTHOPAEDIC SURGERY

## 2024-12-08 RX ORDER — GABAPENTIN 400 MG/1
400 CAPSULE ORAL 2 TIMES DAILY
Status: DISCONTINUED | OUTPATIENT
Start: 2024-12-08 | End: 2024-12-10 | Stop reason: HOSPADM

## 2024-12-08 RX ADMIN — Medication 3 MG: at 20:41

## 2024-12-08 RX ADMIN — LORAZEPAM 1 MG: 1 TABLET ORAL at 02:51

## 2024-12-08 RX ADMIN — ACETAMINOPHEN 650 MG: 325 TABLET ORAL at 19:41

## 2024-12-08 RX ADMIN — LATANOPROST 1 DROP: 50 SOLUTION OPHTHALMIC at 20:42

## 2024-12-08 RX ADMIN — GABAPENTIN 400 MG: 400 CAPSULE ORAL at 14:44

## 2024-12-08 RX ADMIN — ENOXAPARIN SODIUM 30 MG: 100 INJECTION SUBCUTANEOUS at 11:52

## 2024-12-08 RX ADMIN — GABAPENTIN 400 MG: 400 CAPSULE ORAL at 20:41

## 2024-12-08 RX ADMIN — SPIRONOLACTONE 25 MG: 25 TABLET ORAL at 11:35

## 2024-12-08 RX ADMIN — ACETAMINOPHEN 650 MG: 325 TABLET ORAL at 14:44

## 2024-12-08 RX ADMIN — WATER 1000 MG: 1 INJECTION INTRAMUSCULAR; INTRAVENOUS; SUBCUTANEOUS at 11:36

## 2024-12-08 RX ADMIN — SODIUM CHLORIDE, PRESERVATIVE FREE 10 ML: 5 INJECTION INTRAVENOUS at 08:39

## 2024-12-08 RX ADMIN — ACETAMINOPHEN 650 MG: 325 TABLET ORAL at 11:35

## 2024-12-08 RX ADMIN — WATER 1000 MG: 1 INJECTION INTRAMUSCULAR; INTRAVENOUS; SUBCUTANEOUS at 06:26

## 2024-12-08 RX ADMIN — HYDROMORPHONE HYDROCHLORIDE 0.5 MG: 1 INJECTION, SOLUTION INTRAMUSCULAR; INTRAVENOUS; SUBCUTANEOUS at 08:38

## 2024-12-08 NOTE — CONSULTS
Orthopaedic Inpatient Consultation  [facility]    Assessment:   Lesly Ybarra is a 89 y.o. year-old female with right femoral neck Fracture planning surgery    Plan:   -Weighbearing: NWB RLE  -DVT prophylaxis: SCDs, frequent ambulation, hold chemical anticoagulation for surgical planning  -Pain control: Continue as needed pain management, ice to operative area, elevate  -PT/OT for mobilization postoperatively  -Dispo:  -I discussed with the patient her diagnosis and the treatment options.  Because of the displaced fracture, I recommended surgical fixation by hemiarthroplasty.  We talked about the incapacitation that this diagnosis causes.  We then discussed the risks, benefits, complications, convalescence regarding the surgery.  We talked about the significant impact on healing, recovery and its potential burden on achieving the same level of independence.  We discussed continued pain, dislocation/instability, need for revision surgery, infection, limb length discrepancy, thromboembolic disease and the increased mortality rate both within 90 days and at 1 year as this is a marker of general condition. All the questions were answered.  The patient agreed to proceed forth with surgery  - A consent form was signed  - Appropriate surgical site marked  - Hold chemical anticoagulation for surgery  - pain control as needed  - NPO 8 hours prior to surgery     Subjective/History:   Chief Complaint: Right hip pain    Subjective: Lesly Ybarra is a 89 y.o. female  evaluated for right hip pain, inability to bear weight.  She had a fall when she was trying to get out of bed this morning.  She landed directly in the right side.  She is unable to stand or bear weight.  X-ray showed displacement of the left fracture.  She has not had a left intertrochanteric fracture treated by intramedullary nail by me on 2/13/2024.  She lives with her daughter    Anticoagulation: None    Assistive ambulatory device at baseline:

## 2024-12-08 NOTE — PERIOP NOTE
1050: Dr Nunes notified of patient lacking pedal pulse even with doppler in R lower extremity in PACU, RN applied warm blankets to foot, cap refil was initially 7 and dusky purple toes. After intervention still no pulse but cap refill now 4 and colour returning.

## 2024-12-08 NOTE — DISCHARGE INSTRUCTIONS
Dr. Nunes's Hip Fracture treated by Hemiarthroplasty Postoperative Instructions    Follow-Up Appointment:  Follow up in the office 2 weeks after surgery.  To schedule an appointment, please call our office at [phone], extension [phone].   Activity:  Application of ice regularly will prevent inflammation and reduce pain and swelling.  You should ice the area as much as tolerated, but at least 3 times per day for 20-30 minutes.  Unless you have been specifically instructed otherwise, you can advance your activity as tolerated.   Ambulate every hour. Use your incentive spirometer every half hour when resting.  Perform your exercises as often as possible, at least 3 times per day.  Avoid extremes of motion and vigorous activities.  Hip Precautions: Avoid planting your operative foot and rotating (turning) at the hip. Also, keep your toes pointing relatively straightforward without excessive inward or outward turn. Avoid bringing your knee past your belly button.   Avoid low seats, recliners, and bleachers for the first 6-8 weeks  You may bear full weight on your operative extremity with a walker and transition to a cane as soon as you feel comfortable and strong enough.  Advance your activity in a stepwise and cautious manner.  Refrain from any high-level activities until we see you back at your follow up appointment. This includes golfing, exercising, and other more intense activities.  Prevent any falls. Clear your living environment of rugs or floor objects that may be tripping hazards. Use an assistive device as needed for balance and support.  Dressings / Wound Care:  Keep dressing in place until the follow-up visit.   Do not apply antibiotic ointment, creams, or lotions to your incision.  Most incisions are closed with absorbable sutures, but if not, the sutures or staples will be removed at your 2 week follow up.  Dermabond (skin glue) may be used to help close the incision, which appears as a thin,

## 2024-12-08 NOTE — FLOWSHEET NOTE
12/07/24 2112   Family Communication    Relationship to Patient Daughter    Phone Number Coby   Family/Significant Other Update Called;Updated   Delivery Origin Nurse   Message Disposition Family present - message delivered   Update Given Yes   Family Communication   Family Update Message Procedure started

## 2024-12-08 NOTE — OP NOTE
Operative Report    Patient Name: Lesly Ybarra    Patient YOB: 1935    [facility] MRN: [id number]    Date of Procedure: 12/07/24    Surgical Location:     Preoperative diagnosis: right closed displaced femoral neck fracture    Postoperative diagnosis: right closed displaced femoral neck fracture    Procedure: right hip hemiarthroplasty by direct anterior approach    Surgeon: Fernando Nunes MD    Assistants: surgical staff    Anesthesia: General    Preoperative IV Antibiotics: Ancef 2g    Findings: displaced fracture. Implants placed, stable and well fixed    Estimated Blood Loss: 150 mL    Implants: Depuy Bipolar outer femoral head size 44, inner femoral head size 28 + 5 mm, Actis Femoral component Standard Offset size 7     Specimens: None    Complications: None    Disposition: hemodynamically stable to PACU    Condition: stable      Indications for procedure:  The patient presented to the Emergency Department and was found to have a right displaced femoral neck fracture. We discussed treatment options. I recommended surgery to be able to weight bear and ambulate. We discussed the risks, benefits, complications and convalescence regarding hemiarthroplasty by direct anterior approach. The patient agreed to proceed with surgery.    Description of procedure:   The patient was met in the preoperative holding area. The appropriate surgical site was marked. The patient signed a consent form to proceed with surgery. The patient was brought into the operating room and given General anesthesia.  The patient was placed on the [place] table. The right hip was prepped and draped in the usual sterile fashion. A multidisciplinary timeout was performed and IV antibiotics and TXA were dosed. Xray fluoroscopy was used throughout the case for implant alignment.      I began with a direct anterior approach to the right hip. An incision was made directly over the tensor fascia ricci and

## 2024-12-08 NOTE — PERIOP NOTE
TRANSFER - OUT REPORT:    Verbal report given to Jasvir ROBLEDO (name) on Lesly Ybarra  being transferred to  (unit) for routine post-op       Report consisted of patient’s Situation, Background, Assessment and   Recommendations(SBAR).     Time Pre op antibiotic given: 2035  Anesthesia Stop time: 2210    Information from the following report(s) SBAR, Kardex, OR Summary, Procedure Summary, Intake/Output, MAR, Recent Results, and Cardiac Rhythm NSR  was reviewed with the receiving nurse.    Opportunity for questions and clarification was provided.     Is the patient on 02? Yes       L/Min 2       Other -    Is the patient on a monitor? Yes    Is the nurse transporting with the patient? Yes    At transfer, are there Patient Belongings with the patient?  If Yes, please note/list: none    Surgical Waiting Area notified of patient's transfer from PACU? Yes

## 2024-12-08 NOTE — CARE COORDINATION
Patient was confused and in pain today. Cm will follow up tomorrow. Patient s/p fall and found to have hip fracture.

## 2024-12-09 ENCOUNTER — APPOINTMENT (OUTPATIENT)
Facility: HOSPITAL | Age: 88
DRG: 521 | End: 2024-12-09
Payer: MEDICARE

## 2024-12-09 LAB
ANION GAP SERPL CALC-SCNC: 6 MMOL/L (ref 2–12)
BASOPHILS # BLD: 0 K/UL (ref 0–0.1)
BASOPHILS NFR BLD: 0 % (ref 0–1)
BUN SERPL-MCNC: 20 MG/DL (ref 6–20)
BUN/CREAT SERPL: 28 (ref 12–20)
CALCIUM SERPL-MCNC: 10 MG/DL (ref 8.5–10.1)
CHLORIDE SERPL-SCNC: 106 MMOL/L (ref 97–108)
CO2 SERPL-SCNC: 23 MMOL/L (ref 21–32)
CREAT SERPL-MCNC: 0.71 MG/DL (ref 0.55–1.02)
DIFFERENTIAL METHOD BLD: ABNORMAL
EOSINOPHIL # BLD: 0.1 K/UL (ref 0–0.4)
EOSINOPHIL NFR BLD: 1 % (ref 0–7)
ERYTHROCYTE [DISTWIDTH] IN BLOOD BY AUTOMATED COUNT: 13.4 % (ref 11.5–14.5)
GLUCOSE SERPL-MCNC: 98 MG/DL (ref 65–100)
HCT VFR BLD AUTO: 33.3 % (ref 35–47)
HGB BLD-MCNC: 10.9 G/DL (ref 11.5–16)
IMM GRANULOCYTES # BLD AUTO: 0.1 K/UL (ref 0–0.04)
IMM GRANULOCYTES NFR BLD AUTO: 1 % (ref 0–0.5)
LYMPHOCYTES # BLD: 0.5 K/UL (ref 0.8–3.5)
LYMPHOCYTES NFR BLD: 5 % (ref 12–49)
MCH RBC QN AUTO: 31.1 PG (ref 26–34)
MCHC RBC AUTO-ENTMCNC: 32.7 G/DL (ref 30–36.5)
MCV RBC AUTO: 95.1 FL (ref 80–99)
MONOCYTES # BLD: 0.8 K/UL (ref 0–1)
MONOCYTES NFR BLD: 9 % (ref 5–13)
NEUTS SEG # BLD: 8 K/UL (ref 1.8–8)
NEUTS SEG NFR BLD: 85 % (ref 32–75)
NRBC # BLD: 0 K/UL (ref 0–0.01)
NRBC BLD-RTO: 0 PER 100 WBC
PLATELET # BLD AUTO: 114 K/UL (ref 150–400)
PMV BLD AUTO: 11 FL (ref 8.9–12.9)
POTASSIUM SERPL-SCNC: 4.2 MMOL/L (ref 3.5–5.1)
RBC # BLD AUTO: 3.5 M/UL (ref 3.8–5.2)
SODIUM SERPL-SCNC: 135 MMOL/L (ref 136–145)
WBC # BLD AUTO: 9.4 K/UL (ref 3.6–11)

## 2024-12-09 PROCEDURE — 97530 THERAPEUTIC ACTIVITIES: CPT

## 2024-12-09 PROCEDURE — 6370000000 HC RX 637 (ALT 250 FOR IP): Performed by: INTERNAL MEDICINE

## 2024-12-09 PROCEDURE — 92610 EVALUATE SWALLOWING FUNCTION: CPT

## 2024-12-09 PROCEDURE — 85025 COMPLETE CBC W/AUTO DIFF WBC: CPT

## 2024-12-09 PROCEDURE — 71045 X-RAY EXAM CHEST 1 VIEW: CPT

## 2024-12-09 PROCEDURE — 80048 BASIC METABOLIC PNL TOTAL CA: CPT

## 2024-12-09 PROCEDURE — 2580000003 HC RX 258: Performed by: ORTHOPAEDIC SURGERY

## 2024-12-09 PROCEDURE — 2580000003 HC RX 258: Performed by: INTERNAL MEDICINE

## 2024-12-09 PROCEDURE — 97161 PT EVAL LOW COMPLEX 20 MIN: CPT

## 2024-12-09 PROCEDURE — 99024 POSTOP FOLLOW-UP VISIT: CPT | Performed by: PHYSICIAN ASSISTANT

## 2024-12-09 PROCEDURE — 1100000000 HC RM PRIVATE

## 2024-12-09 PROCEDURE — 97166 OT EVAL MOD COMPLEX 45 MIN: CPT

## 2024-12-09 PROCEDURE — 6360000002 HC RX W HCPCS: Performed by: ORTHOPAEDIC SURGERY

## 2024-12-09 PROCEDURE — 6370000000 HC RX 637 (ALT 250 FOR IP): Performed by: HOSPITALIST

## 2024-12-09 PROCEDURE — 6370000000 HC RX 637 (ALT 250 FOR IP): Performed by: ORTHOPAEDIC SURGERY

## 2024-12-09 RX ADMIN — ENOXAPARIN SODIUM 30 MG: 100 INJECTION SUBCUTANEOUS at 13:42

## 2024-12-09 RX ADMIN — ACETAMINOPHEN 650 MG: 325 TABLET ORAL at 13:41

## 2024-12-09 RX ADMIN — Medication 10 ML: at 20:49

## 2024-12-09 RX ADMIN — SODIUM CHLORIDE, PRESERVATIVE FREE 10 ML: 5 INJECTION INTRAVENOUS at 20:49

## 2024-12-09 RX ADMIN — LATANOPROST 1 DROP: 50 SOLUTION OPHTHALMIC at 20:48

## 2024-12-09 RX ADMIN — GABAPENTIN 400 MG: 400 CAPSULE ORAL at 13:42

## 2024-12-09 RX ADMIN — ACETAMINOPHEN 650 MG: 325 TABLET ORAL at 20:47

## 2024-12-09 RX ADMIN — SPIRONOLACTONE 25 MG: 25 TABLET ORAL at 13:42

## 2024-12-09 RX ADMIN — GABAPENTIN 400 MG: 400 CAPSULE ORAL at 20:48

## 2024-12-09 ASSESSMENT — PAIN - FUNCTIONAL ASSESSMENT: PAIN_FUNCTIONAL_ASSESSMENT: PREVENTS OR INTERFERES SOME ACTIVE ACTIVITIES AND ADLS

## 2024-12-09 ASSESSMENT — PAIN SCALES - GENERAL
PAINLEVEL_OUTOF10: 5
PAINLEVEL_OUTOF10: 1
PAINLEVEL_OUTOF10: 2
PAINLEVEL_OUTOF10: 6
PAINLEVEL_OUTOF10: 1

## 2024-12-09 ASSESSMENT — PAIN DESCRIPTION - DESCRIPTORS: DESCRIPTORS: ACHING

## 2024-12-09 ASSESSMENT — PAIN DESCRIPTION - LOCATION: LOCATION: HIP

## 2024-12-09 ASSESSMENT — PAIN DESCRIPTION - ORIENTATION: ORIENTATION: RIGHT

## 2024-12-09 NOTE — CARE COORDINATION
Brief CM Note:    CM placed referral to [facility] in [facility] for SNF.    3 night stay = earliest d/c of 12/10   Chief Complaint   Patient presents with    Results    Sinus Infection     HPI:  Britt Valdovinos is a 62 y.o. AA female to discussed results. Tot chol and LDL are trending up, serum vit D is low normal, A1C is at borderline diabetes level. Kidney and liver functions are normal, thyroid function is normal  Results have been reviewed with patient. Also, she has additional complaints of cough and stuffy nose for 2 weeks. Cough is productive with yellow sputum. Denies fever/chills, sob, wheezing. Review of Systems  As per hpi    Past Medical History:   Diagnosis Date    Dyslipidemia     Lung cancer [facility])      Past Surgical History:   Procedure Laterality Date    HX MYOMECTOMY  2004    HX OTHER SURGICAL  2016    lung surgery      Social History     Socioeconomic History    Marital status: UNKNOWN     Spouse name: Not on file    Number of children: Not on file    Years of education: Not on file    Highest education level: Not on file   Social Needs    Financial resource strain: Not on file    Food insecurity - worry: Not on file    Food insecurity - inability: Not on file   [employer] needs - medical: Not on file   [employer] needs - non-medical: Not on file   Occupational History    Not on file   Tobacco Use    Smoking status: Never Smoker    Smokeless tobacco: Never Used   Substance and Sexual Activity    Alcohol use: No     Frequency: Never    Drug use: No    Sexual activity: Not Currently   Other Topics Concern    Not on file   Social History Narrative    Not on file     Family History   Problem Relation Age of Onset    Heart Disease Mother     Hypertension Mother      Current Outpatient Medications   Medication Sig Dispense Refill    letrozole (FEMARA) 2.5 mg tablet Take 2.5 mg by mouth daily.        No Known Allergies    Objective:  Visit Vitals  /78   Pulse 80   Temp 98.3 °F (36.8 °C)   Resp 20   Ht 5' 9\" (1.753 m)   Wt 179 lb (81.2 kg)   SpO2 98%   BMI 26.43 kg/m² Physical Exam:   General appearance - alert, well appearing in no distress  EYE-PERRL, EOMI  ENT-ENT exam normal, no neck nodes or sinus tenderness  Neck - supple, no significant adenopathy   Chest - bronchial breath sound without wheezes, rales or rhonchi  Heart - normal rate, regular rhythm, normal blood pressure  Abdomen - soft, nontender, nondistended, no organomegaly  Ext-peripheral pulses normal, no pedal edema  Neuro -alert, oriented, normal speech, no focal findings  Back-full range of motion, no tenderness, palpable spasm or pain on motion     Results for orders placed or performed in visit on 10/18/18   CBC WITH AUTOMATED DIFF   Result Value Ref Range    WBC 3.9 3.4 - 10.8 x10E3/uL    RBC 4.78 3.77 - 5.28 x10E6/uL    HGB 12.5 11.1 - 15.9 g/dL    HCT 38.9 34.0 - 46.6 %    MCV 81 79 - 97 fL    MCH 26.2 (L) 26.6 - 33.0 pg    MCHC 32.1 31.5 - 35.7 g/dL    RDW 14.3 12.3 - 15.4 %    PLATELET 735 220 - 031 x10E3/uL    NEUTROPHILS 66 Not Estab. %    Lymphocytes 19 Not Estab. %    MONOCYTES 10 Not Estab. %    EOSINOPHILS 4 Not Estab. %    BASOPHILS 1 Not Estab. %    ABS. NEUTROPHILS 2.6 1.4 - 7.0 x10E3/uL    Abs Lymphocytes 0.7 0.7 - 3.1 x10E3/uL    ABS. MONOCYTES 0.4 0.1 - 0.9 x10E3/uL    ABS. EOSINOPHILS 0.2 0.0 - 0.4 x10E3/uL    ABS. BASOPHILS 0.0 0.0 - 0.2 x10E3/uL    IMMATURE GRANULOCYTES 0 Not Estab. %    ABS. IMM.  GRANS. 0.0 0.0 - 0.1 O36Y5/UY   METABOLIC PANEL, COMPREHENSIVE   Result Value Ref Range    Glucose 96 65 - 99 mg/dL    BUN 11 6 - 24 mg/dL    Creatinine 0.93 0.57 - 1.00 mg/dL    GFR est non-AA 68 >59 mL/min/1.73    GFR est AA 79 >59 mL/min/1.73    BUN/Creatinine ratio 12 9 - 23    Sodium 142 134 - 144 mmol/L    Potassium 4.6 3.5 - 5.2 mmol/L    Chloride 103 96 - 106 mmol/L    CO2 23 20 - 29 mmol/L    Calcium 10.2 8.7 - 10.2 mg/dL    Protein, total 7.5 6.0 - 8.5 g/dL    Albumin 4.6 3.5 - 5.5 g/dL    GLOBULIN, TOTAL 2.9 1.5 - 4.5 g/dL    A-G Ratio 1.6 1.2 - 2.2    Bilirubin, total 0.3 0.0 - 1.2 mg/dL    Alk. phosphatase 88 39 - 117 IU/L    AST (SGOT) 23 0 - 40 IU/L    ALT (SGPT) 20 0 - 32 IU/L   TSH 3RD GENERATION   Result Value Ref Range    TSH 1.670 0.450 - 4.500 uIU/mL   URINALYSIS W/ RFLX MICROSCOPIC   Result Value Ref Range    Specific Gravity 1.024 1.005 - 1.030    pH (UA) 6.0 5.0 - 7.5    Color Yellow Yellow    Appearance Clear Clear    Leukocyte Esterase Negative Negative    Protein Negative Negative/Trace    Glucose Negative Negative    Ketone Negative Negative    Blood Negative Negative    Bilirubin Negative Negative    Urobilinogen 0.2 0.2 - 1.0 mg/dL    Nitrites Negative Negative    Microscopic Examination Comment    VITAMIN D, 25 HYDROXY   Result Value Ref Range    VITAMIN D, 25-HYDROXY 34.4 30.0 - 100.0 ng/mL   LIPID PANEL   Result Value Ref Range    Cholesterol, total 219 (H) 100 - 199 mg/dL    Triglyceride 137 0 - 149 mg/dL    HDL Cholesterol 44 >39 mg/dL    VLDL, calculated 27 5 - 40 mg/dL    LDL, calculated 148 (H) 0 - 99 mg/dL   HEMOGLOBIN A1C WITH EAG   Result Value Ref Range    Hemoglobin A1c 6.1 (H) 4.8 - 5.6 %    Estimated average glucose 128 mg/dL   HEPATITIS C AB   Result Value Ref Range    Hep C Virus Ab <0.1 0.0 - 0.9 s/co ratio     Assessment/Plan:  Diagnoses and all orders for this visit:    Acute bronchitis due to infection  -     azithromycin (ZITHROMAX) 250 mg tablet; use as directed, Normal, Disp-6 Tab, R-0  -     promethazine-dextromethorphan (PROMETHAZINE-DM) 6.25-15 mg/5 mL syrup; Take 5 mL by mouth every four (4) hours as needed for Cough for up to 7 days. , Normal, Disp-118 mL, R-0    Hypovitaminosis D  -     cholecalciferol, VITAMIN D3, (VITAMIN D3) 5,000 unit tab tablet; Take 1 Tab by mouth daily. , Normal, Disp-90 Tab, R-1    Dyslipidemia (high LDL; low HDL)    Borderline diabetes mellitus    Colon cancer screening  -     REFERRAL TO GASTROENTEROLOGY      Patient Instructions        Learning About Vitamin D  Why is it important to get enough vitamin D?     Your body needs vitamin D to absorb calcium. Calcium keeps your bones and muscles, including your heart, healthy and strong. If your muscles don't get enough calcium, they can cramp, hurt, or feel weak. You may have long-term (chronic) muscle aches and pains. If you don't get enough vitamin D throughout life, you have an increased chance of having thin and brittle bones (osteoporosis) in your later years. Children who don't get enough vitamin D may not grow as much as others their age. They also have a chance of getting a rare disease called rickets. It causes weak bones. Vitamin D and calcium are added to many foods. And your body uses sunshine to make its own vitamin D. How much vitamin D do you need? The [facility] recommends that people ages 3 through 79 get 600 IU (international units) every day. Adults 71 and older need 800 IU every day. Blood tests for vitamin D can check your vitamin D level. But there is no standard normal range used by all laboratories. You're likely getting enough vitamin D if your levels are in the range of 20 to 50 ng/mL. How can you get more vitamin D? Foods that contain vitamin D include:  · [place], tuna, and mackerel. These are some of the best foods to eat when you need to get more vitamin D.  · Cheese, egg yolks, and beef liver. These foods have vitamin D in small amounts. · Milk, soy drinks, orange juice, yogurt, margarine, and some kinds of cereal have vitamin D added to them. Some people don't make vitamin D as well as others. They may have to take extra care in getting enough vitamin D. Things that reduce how much vitamin D your body makes include:  · Dark skin, such as many  Americans have. · Age, especially if you are older than 72. · Digestive problems, such as Crohn's or celiac disease. · Liver and kidney disease. Some people who do not get enough vitamin D may need supplements.   Are there any risks from taking vitamin D?  · Too much vitamin D:  ? Can damage your kidneys. ? Can cause nausea and vomiting, constipation, and weakness. ? Raises the amount of calcium in your blood. If this happens, you can get confused or have an irregular heart rhythm. · Vitamin D may interact with other medicines. Tell your doctor about all of the medicines you take, including over-the-counter drugs, herbs, and pills. Tell your doctor about all of your current medical problems. Where can you learn more? Go to http://kirti-jazmyn.info/. Enter [id number] in the search box to learn more about \"Learning About Vitamin D.\"  Current as of: March 29, 2018  Content Version: 11.8  © 4501-8392 [employer]. Care instructions adapted under license by [employer] (which disclaims liability or warranty for this information). If you have questions about a medical condition or this instruction, always ask your healthcare professional. [place] [address] any warranty or liability for your use of this information. Follow-up Disposition:  Return in about 4 months (around 3/1/2019), or if symptoms worsen or fail to improve, for routine follow up.

## 2024-12-09 NOTE — CARE COORDINATION
Care Management Initial Assessment       RUR:14%  Readmission? No  1st IM letter given? Yes - 12/8  1st  letter given: No   CM met with pt and daughter, Coby Chavez [phone], to introduce self and CM role and discuss dc plans. Patient lives in a one story house with her daughter. Patient uses a walker to ambulate and requires assistance with bathing.  Patient has been to [facility] in the past and this is choice if patient needs SNF rehab at [facility].  Second choice is [facility].    Therapy recs pending.    Insurance: Medicare A&B primary and [facility] Medicare secondary.     12/09/24 1106   Service Assessment   Patient Orientation Alert and Oriented   Cognition Alert   History Provided By Child/Family   Primary Caregiver Family   Support Systems Children   Patient's Healthcare Decision Maker is: Named in Scanned ACP Document   PCP Verified by CM Yes   Last Visit to PCP Within last 3 months   Prior Functional Level Assistance with the following:;Bathing   Current Functional Level Assistance with the following:;Bathing;Mobility   Can patient return to prior living arrangement Yes   Ability to make needs known: Fair   Family able to assist with home care needs: Yes   Would you like for me to discuss the discharge plan with any other family members/significant others, and if so, who? Yes  (Coby Chavez [phone])   Financial Resources Medicare   Community Resources None   Social/Functional History   Lives With Daughter   Type of Home House   Home Layout Able to Live on Main level with bedroom/bathroom   Bathroom Shower/Tub Walk-in shower   Bathroom Toilet Handicap height   Bathroom Equipment Shower chair   Bathroom Accessibility Not accessible   Home Equipment Walker - Rolling   Receives Help From Family   Prior Level of Assist for ADLs Needs assistance   Bath Stand by assistance   Dressing Independent   Grooming Modified independent    Feeding Modified independent    Toileting Independent   Prior

## 2024-12-10 VITALS
TEMPERATURE: 97.8 F | OXYGEN SATURATION: 94 % | BODY MASS INDEX: 16.57 KG/M2 | RESPIRATION RATE: 16 BRPM | WEIGHT: 105.6 LBS | DIASTOLIC BLOOD PRESSURE: 71 MMHG | HEIGHT: 67 IN | SYSTOLIC BLOOD PRESSURE: 125 MMHG | HEART RATE: 105 BPM

## 2024-12-10 PROBLEM — S72.001A CLOSED FRACTURE OF NECK OF RIGHT FEMUR, INITIAL ENCOUNTER (HCC): Status: RESOLVED | Noted: 2024-12-07 | Resolved: 2024-12-10

## 2024-12-10 LAB
ANION GAP SERPL CALC-SCNC: 4 MMOL/L (ref 2–12)
BASOPHILS # BLD: 0 K/UL (ref 0–0.1)
BASOPHILS NFR BLD: 0 % (ref 0–1)
BUN SERPL-MCNC: 17 MG/DL (ref 6–20)
BUN/CREAT SERPL: 31 (ref 12–20)
CALCIUM SERPL-MCNC: 9.5 MG/DL (ref 8.5–10.1)
CHLORIDE SERPL-SCNC: 105 MMOL/L (ref 97–108)
CO2 SERPL-SCNC: 27 MMOL/L (ref 21–32)
CREAT SERPL-MCNC: 0.55 MG/DL (ref 0.55–1.02)
DIFFERENTIAL METHOD BLD: ABNORMAL
EOSINOPHIL # BLD: 0.2 K/UL (ref 0–0.4)
EOSINOPHIL NFR BLD: 2 % (ref 0–7)
ERYTHROCYTE [DISTWIDTH] IN BLOOD BY AUTOMATED COUNT: 13.3 % (ref 11.5–14.5)
GLUCOSE SERPL-MCNC: 94 MG/DL (ref 65–100)
HCT VFR BLD AUTO: 30 % (ref 35–47)
HGB BLD-MCNC: 9.9 G/DL (ref 11.5–16)
IMM GRANULOCYTES # BLD AUTO: 0 K/UL (ref 0–0.04)
IMM GRANULOCYTES NFR BLD AUTO: 0 % (ref 0–0.5)
LYMPHOCYTES # BLD: 0.5 K/UL (ref 0.8–3.5)
LYMPHOCYTES NFR BLD: 6 % (ref 12–49)
MCH RBC QN AUTO: 31.3 PG (ref 26–34)
MCHC RBC AUTO-ENTMCNC: 33 G/DL (ref 30–36.5)
MCV RBC AUTO: 94.9 FL (ref 80–99)
MONOCYTES # BLD: 0.8 K/UL (ref 0–1)
MONOCYTES NFR BLD: 10 % (ref 5–13)
NEUTS SEG # BLD: 6.8 K/UL (ref 1.8–8)
NEUTS SEG NFR BLD: 82 % (ref 32–75)
NRBC # BLD: 0 K/UL (ref 0–0.01)
NRBC BLD-RTO: 0 PER 100 WBC
PLATELET # BLD AUTO: 111 K/UL (ref 150–400)
PMV BLD AUTO: 11.8 FL (ref 8.9–12.9)
POTASSIUM SERPL-SCNC: 4.1 MMOL/L (ref 3.5–5.1)
RBC # BLD AUTO: 3.16 M/UL (ref 3.8–5.2)
RBC MORPH BLD: ABNORMAL
SODIUM SERPL-SCNC: 136 MMOL/L (ref 136–145)
WBC # BLD AUTO: 8.3 K/UL (ref 3.6–11)

## 2024-12-10 PROCEDURE — 99024 POSTOP FOLLOW-UP VISIT: CPT | Performed by: PHYSICIAN ASSISTANT

## 2024-12-10 PROCEDURE — 6360000002 HC RX W HCPCS: Performed by: ORTHOPAEDIC SURGERY

## 2024-12-10 PROCEDURE — 92526 ORAL FUNCTION THERAPY: CPT

## 2024-12-10 PROCEDURE — 85025 COMPLETE CBC W/AUTO DIFF WBC: CPT

## 2024-12-10 PROCEDURE — 6370000000 HC RX 637 (ALT 250 FOR IP): Performed by: ORTHOPAEDIC SURGERY

## 2024-12-10 PROCEDURE — 97530 THERAPEUTIC ACTIVITIES: CPT

## 2024-12-10 PROCEDURE — 80048 BASIC METABOLIC PNL TOTAL CA: CPT

## 2024-12-10 PROCEDURE — 6370000000 HC RX 637 (ALT 250 FOR IP): Performed by: HOSPITALIST

## 2024-12-10 RX ORDER — GABAPENTIN 400 MG/1
400 CAPSULE ORAL 2 TIMES DAILY
Qty: 90 CAPSULE | Refills: 3 | Status: SHIPPED | OUTPATIENT
Start: 2024-12-10 | End: 2025-06-08

## 2024-12-10 RX ORDER — BISACODYL 5 MG/1
5 TABLET, DELAYED RELEASE ORAL DAILY PRN
Qty: 30 TABLET | Refills: 1 | Status: SHIPPED | OUTPATIENT
Start: 2024-12-10

## 2024-12-10 RX ORDER — MAGNESIUM HYDROXIDE/ALUMINUM HYDROXICE/SIMETHICONE 120; 1200; 1200 MG/30ML; MG/30ML; MG/30ML
15 SUSPENSION ORAL EVERY 6 HOURS PRN
Qty: 355 ML | Refills: 0 | Status: SHIPPED | OUTPATIENT
Start: 2024-12-10

## 2024-12-10 RX ORDER — POLYETHYLENE GLYCOL 3350 17 G/17G
17 POWDER, FOR SOLUTION ORAL DAILY PRN
Qty: 527 G | Refills: 1 | Status: SHIPPED | OUTPATIENT
Start: 2024-12-10 | End: 2025-02-10

## 2024-12-10 RX ORDER — TRAMADOL HYDROCHLORIDE 50 MG/1
50 TABLET ORAL EVERY 4 HOURS PRN
Qty: 50 TABLET | Refills: 0 | Status: SHIPPED | OUTPATIENT
Start: 2024-12-10 | End: 2024-12-15

## 2024-12-10 RX ORDER — ENOXAPARIN SODIUM 100 MG/ML
30 INJECTION SUBCUTANEOUS DAILY
Qty: 30 EACH | Refills: 0 | Status: SHIPPED | OUTPATIENT
Start: 2024-12-11

## 2024-12-10 RX ADMIN — ENOXAPARIN SODIUM 30 MG: 100 INJECTION SUBCUTANEOUS at 08:58

## 2024-12-10 RX ADMIN — GABAPENTIN 400 MG: 400 CAPSULE ORAL at 08:58

## 2024-12-10 RX ADMIN — ACETAMINOPHEN 650 MG: 325 TABLET ORAL at 08:58

## 2024-12-10 NOTE — CARE COORDINATION
Transition of Care Plan:    RUR: 14%  Prior Level of Functioning: Assistance with bathing, mobility  Disposition: SNF rehab-[facility] will accept today into room 337.  CM spoke to [facility] [phone]  [facility] scheduled for 1:30pm.    RN call report to [phone]  KENNEDY: 12/10  If SNF or IPR: Date FOC offered: 12/09  Date FOC received: 12/09  Accepting facility: [facility]  Date authorization started with reference number:   Date authorization received and expires:   Follow up appointments: PCP/Specialist  DME needed: No  Transportation at discharge: [facility] 1:30pm  IM/IMM Medicare/ letter given: 12/10  Is patient a  and connected with VA?    If yes, was  transfer form completed and VA notified?   Caregiver Contact: Coby Chavez [phone]  Discharge Caregiver contacted prior to discharge?   Care Conference needed? No  Barriers to discharge:  None    Natali Ferro RN/CRM  [phone]

## 2024-12-10 NOTE — DISCHARGE SUMMARY
CHRONIC MEDICAL DIAGNOSES:      Greater than 31 minutes were spent with the patient on counseling and coordination of care    Signed:   TENZIN Serrato CNP  12/10/2024  11:04 AM

## 2024-12-10 NOTE — PLAN OF CARE
Problem: Discharge Planning  Goal: Discharge to home or other facility with appropriate resources  12/10/2024 0935 by Nandini Patel RN  Outcome: Progressing  12/9/2024 2043 by Funmilayo Mathias RN  Outcome: Progressing     Problem: Pain  Goal: Verbalizes/displays adequate comfort level or baseline comfort level  12/10/2024 0935 by Nandini Patel RN  Outcome: Progressing  12/9/2024 2043 by Funmilayo Mathias RN  Outcome: Progressing     Problem: Safety - Adult  Goal: Free from fall injury  12/10/2024 0935 by Nandini Patel RN  Outcome: Progressing  12/9/2024 2043 by Funmilayo Mathias RN  Outcome: Progressing     Problem: Skin/Tissue Integrity  Goal: Absence of new skin breakdown  Description: 1.  Monitor for areas of redness and/or skin breakdown  2.  Assess vascular access sites hourly  3.  Every 4-6 hours minimum:  Change oxygen saturation probe site  4.  Every 4-6 hours:  If on nasal continuous positive airway pressure, respiratory therapy assess nares and determine need for appliance change or resting period.  12/10/2024 0935 by Nandini Patel RN  Outcome: Progressing  12/9/2024 2043 by Funmilayo Mathias RN  Outcome: Progressing     Problem: ABCDS Injury Assessment  Goal: Absence of physical injury  12/10/2024 0935 by Nandini Patel RN  Outcome: Progressing  12/9/2024 2043 by Funmilayo Mathias RN  Outcome: Progressing     
  Problem: Discharge Planning  Goal: Discharge to home or other facility with appropriate resources  Outcome: Progressing     Problem: Pain  Goal: Verbalizes/displays adequate comfort level or baseline comfort level  Outcome: Progressing     Problem: Safety - Adult  Goal: Free from fall injury  Outcome: Progressing     Problem: Skin/Tissue Integrity  Goal: Absence of new skin breakdown  Description: 1.  Monitor for areas of redness and/or skin breakdown  2.  Assess vascular access sites hourly  3.  Every 4-6 hours minimum:  Change oxygen saturation probe site  4.  Every 4-6 hours:  If on nasal continuous positive airway pressure, respiratory therapy assess nares and determine need for appliance change or resting period.  Outcome: Progressing     Problem: ABCDS Injury Assessment  Goal: Absence of physical injury  Outcome: Progressing     Problem: SLP Adult - Impaired Swallowing  Goal: By Discharge: Advance to least restrictive diet without signs or symptoms of aspiration for planned discharge setting.  See evaluation for individualized goals.  Description: Speech pathology goals initiated 12/9/2024   1. Patient will tolerate least restrictive diet free of s/s aspiration  12/9/2024 1100 by Luz Rosen, SLP  Outcome: Progressing     Problem: Occupational Therapy - Adult  Goal: By Discharge: Performs self-care activities at highest level of function for planned discharge setting.  See evaluation for individualized goals.  Description: FUNCTIONAL STATUS PRIOR TO ADMISSION:  Pt lives with her daughter and is typically independent with BADLs with exception of bathing. Pt's daughter assists with all IADLs at baseline.  Receives Help From: Family, Prior Level of Assist for ADLs: Needs assistance, Bath: Moderate assistance (Assistance from daughter to transfer in and assistance for dressing afterwards due to fatigue/routine), Dressing: Independent, Grooming: Modified independent , Feeding: Modified independent , 
  Problem: Physical Therapy - Adult  Goal: By Discharge: Performs mobility at highest level of function for planned discharge setting.  See evaluation for individualized goals.  Description: FUNCTIONAL STATUS PRIOR TO ADMISSION: Patient was modified independent using a rolling walker for functional mobility. and The patient  required setup assistance for basic and instrumental ADLs.    HOME SUPPORT PRIOR TO ADMISSION: The patient lived with daughter for high complexity tasks like food and meds but did not require assistance for basic mobility.    Physical Therapy Goals  Initiated 12/9/2024  1.  Patient will move from supine to sit and sit to supine, scoot up and down, and roll side to side in bed with supervision/set-up within 4 day(s).    2.  Patient will perform sit to stand with supervision/set-up within 4 day(s).  3.  Patient will transfer from bed to chair and chair to bed with supervision/set-up using the least restrictive device within 4 day(s).  4.  Patient will ambulate with minimal assistance for 50 feet with the least restrictive device within 4 day(s).   5.  Patient will perform hip home exercise program per protocol with minimal assistance within 4 days.        Outcome: Progressing     PHYSICAL THERAPY TREATMENT    Patient: Lesly Ybarra (89 y.o. female)  Date: 12/10/2024  Diagnosis: Fall from bed, initial encounter [W06.XXXA]  Closed fracture of neck of right femur, initial encounter ([facility]) [S72.001A] Closed fracture of neck of right femur, initial encounter ([facility])  Procedure(s) (LRB):  RIGHT HIP HEMIARTHROPLASTY (Right) 3 Days Post-Op  Precautions: Fall Risk, Weight Bearing Right Lower Extremity Weight Bearing: Weight Bearing As Tolerated                    ASSESSMENT:  Patient continues to benefit from skilled PT services and is slowly progressing towards goals. Pt greatly improved in conversation, ability to follow situation and decreased lethargy. Discussion and education regarding situation performed 
  Problem: Physical Therapy - Adult  Goal: By Discharge: Performs mobility at highest level of function for planned discharge setting.  See evaluation for individualized goals.  Description: FUNCTIONAL STATUS PRIOR TO ADMISSION: Patient was modified independent using a rolling walker for functional mobility. and The patient  required setup assistance for basic and instrumental ADLs.    HOME SUPPORT PRIOR TO ADMISSION: The patient lived with daughter for high complexity tasks like food and meds but did not require assistance for basic mobility.    Physical Therapy Goals  Initiated 12/9/2024  1.  Patient will move from supine to sit and sit to supine, scoot up and down, and roll side to side in bed with supervision/set-up within 4 day(s).    2.  Patient will perform sit to stand with supervision/set-up within 4 day(s).  3.  Patient will transfer from bed to chair and chair to bed with supervision/set-up using the least restrictive device within 4 day(s).  4.  Patient will ambulate with minimal assistance for 50 feet with the least restrictive device within 4 day(s).   5.  Patient will perform hip home exercise program per protocol with minimal assistance within 4 days.  {therapy hip goal (Optional):89221}      Outcome: Progressing     PHYSICAL THERAPY EVALUATION    Patient: Lesly Ybarra (89 y.o. female)  Date: 12/9/2024  Primary Diagnosis: Fall from bed, initial encounter [W06.XXXA]  Closed fracture of neck of right femur, initial encounter ([facility]) [S72.001A]  Procedure(s) (LRB):  RIGHT HIP HEMIARTHROPLASTY (Right) 2 Days Post-Op   Precautions: Restrictions/Precautions: Fall Risk, Weight Bearing   Lower Extremity Weight Bearing Restrictions  Right Lower Extremity Weight Bearing: Weight Bearing As Tolerated                  ASSESSMENT :   DEFICITS/IMPAIRMENTS:   The patient is limited by decreased {Therapy functional impairments:01780}     Based on the impairments listed above ***.    Patient will benefit from skilled 
3x/wk     PLAN :  Recommendations and Planned Interventions:  Diet: Soft and bite sized diet/ thin liquids. SINGLE straw sips- may need to pull away to limit sip size  -- use ice chips to moisten mouth first if difficulty extracting liquid from straw.  -- meds whole in applesauce  -- upright as much as possible and utilize reverse trendelenburg to achieve improved positioning      Acute SLP Services: No, patient will be discharged from acute skilled speech-language pathology at this time.  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 Trials:  PO Trials  Neuromuscular Estim Used: No  Assessment Method(s): Observation  Patient Position: up in bed  Vocal Quality: No Impairment  Consistency Presented: Thin;Soft & Bite Sized;Pureed  How Presented: SLP-fed/Presented;Straw;Spoon  Bolus Acceptance: No impairment  Bolus Formation/Control: Impaired  Type of Impairment: Delayed;Mastication  Propulsion: Delayed (# of seconds)  Oral Residue: None  Aspiration Signs/Symptoms: Delayed cough/throat clear (on initial sip only)  Pharyngeal Phase Characteristics: Double swallow;Painful swallow (reported some discomfort s/p choking episode this morning)  Oral Phase  Oral Phase - Comment: mild  Pharyngeal Phase Comment: suspect mild        Respiratory 
patient's plan of care including recommendations, planned interventions, and recommended diet changes were discussed with: Registered nurse    Patient/family agree to work toward stated goals and plan of care    Thank you,  Luz Rosen, SLP    SLP Individual Minutes  Time In: 1234  Time Out: 1242  Minutes: 8    
Dependent/Total;Based on clinical judgement     Functional Mobility: Maximum assistance  Functional Mobility Skilled Clinical Factors: Pt able to take a few side steps with ModA x2 using RW. Unable to reach chair before needing to sit on EOB.     ADL Intervention and task modifications:      Transfer Training  Transfer Training: Yes  Interventions: Verbal cues;Visual cues;Demonstration;Tactile cues;Weight shifting training/pressure relief  Sit to Stand: Minimum assistance;Assist X2;Adaptive equipment  Stand to Sit: Minimum assistance;Assist X2;Adaptive equipment  Stand Pivot Transfers: Minimum assistance;Moderate assistance;Assist X2 (attempted, abandoned)      [facility]-PACTM \"6 Clicks\"                                                       Daily Activity Inpatient Short Form  How much help from another person does the patient currently need... Total; A Lot A Little None   1.  Putting on and taking off regular lower body clothing? [x]  1 []  2 []  3 []  4   2.  Bathing (including washing, rinsing, drying)? []  1 [x]  2 []  3 []  4   3.  Toileting, which includes using toilet, bedpan or urinal? [x] 1 []  2 []  3 []  4   4.  Putting on and taking off regular upper body clothing? []  1 []  2 [x]  3 []  4   5.  Taking care of personal grooming such as brushing teeth? []  1 []  2 [x]  3 []  4   6.  Eating meals? []  1 []  2 [x]  3 []  4   © 2007, Trustees of [facility], under license to [employer]. All rights reserved     Score: 13/24     Interpretation of Tool:  Represents clinically-significant functional categories (i.e. Activities of daily living).    Cutoff score 39.4 (19) correlates to a good likelihood of discharging home versus a facility  Beulah Diaz, Liliana Green, Devon Perez, Marilee Swanson, Roberto Sood, Bhupinder Diaz, -PAC “6-Clicks” Functional Assessment Scores Predict Acute Care Hospital Discharge Destination, Physical Therapy, Volume 94, Issue 9, 1 September

## 2024-12-11 NOTE — PROGRESS NOTES
Hospitalist Progress Note                               Roshan Silva MD                                     Answering service: [phone]                               OR [phone] from in house phone                                         Date of Service:  2024  NAME:  Lesly Ybarra  :  1935  MRN:  [id number]      Admission Summary:   Lesly Ybarra is a 89 y.o. female with systemic sclerosis ([facility]), Raynaud phenomenon, telangiectasias, GERD, urinary bladder CA on BCG and IL ([facility]), h/o left lung adenocarcinoma s/p wedge resection in remission ([facility], ), HTN, h/o lymphoma, R frontoparietal brain mass, s/p L femoral neck fracture s/p internal fixation who was BIBEMS with R hip pain s/p fall out of bed this morning. She denied head trauma and LOC     Reason for follow up:     R femoral neck fracture daughter on bed side patient is anxious no acute issues      Assessment & Plan:     Acute impacted superiorly displaced right femoral neck fracture .SP right hemiarthroplasty  Added Gabapentin per family request  Hypertension: on Aldactone  Mild hypercalcemia : resolved   H/o lymphoma  Urinary bladder CA on BCG and IL ([facility])  H/o left lung adenocarcinoma s/p wedge resection in remission ([facility], )   Systemic sclerosis   R frontoparietal meningioma  10. Mild Hyponatremia: monitor           Code status: DNR  DVT prophylaxis: Lovenox  Care Plan discussed with: Family  Patient has given Verbal permission to discuss medical care with   persons present in the room and and also with contact as listed on face sheet.   Discharge planning/disposition:TBD          Review of Systems:   Pertinent items are noted in HPI.     Physical Examination:      Last 24hrs VS reviewed since prior progress note. Most recent are:  Vitals:    24 1200   BP:    Pulse: 95   Resp:    Temp:    SpO2:            Constitutional:  No 
        [facility]                                                                                          Hospitalist Progress Note  TENZIN Serrato - CNP  Office Phone: [phone]        Date of Service:  2024  NAME:  Lesly Ybarra  :  1935  MRN:  [id number]       Admission Summary:   Lesly Ybarra is a 89 y.o. female with systemic sclerosis ([facility]), Raynaud phenomenon, telangiectasias, GERD, urinary bladder CA on BCG and IL ([facility]), h/o left lung adenocarcinoma s/p wedge resection in remission ([facility], ), HTN, h/o lymphoma, R frontoparietal brain mass, s/p L femoral neck fracture s/p internal fixation who was BIBEMS with R hip pain s/p fall out of bed this morning. She denied head trauma and LOC        Interval history / Subjective:   POD 2 s/p R hip hemiarthroplasty, remains confused however daughter reports she is better than after her hip surgery earlier this year.      Rapid response called this morning for choking episode.  She choked on a large piece of sausage link that doesn't appear she even chewed.  Nursing had evacuated the culprit breakfast meat prior to my arrival on the unit.  Daughter at the bedside.  Patient sitting up, mildly tachycardic but in no respiratory distress noted.  CXR and speech evals ordered.  Patient not answering all questions appropriately, states she wants to go home.       Assessment & Plan:     Acute impacted superiorly displaced right femoral neck fracture, initial encounter ([facility])  - POD 2 s/p right hip hemiarthroplasty  - pt is considered low cardiac risk for orthopedic surgery  - prn pain and nausea control  - CK WNL  - vitamin D level normal  - postop PT/OT evals  - fall precautions     HTN: Reasonably well-controlled  - likely due to acute medical issues  - resume home spironolactone and titrate antihypertensive regimen as indicated  - pain control     Mild hypercalcemia  - 
  Ortho Daily Progress Note      Patient: Lesly Ybarra                   MRN: [id number]  Sex: female  YOB: 1935           Age: 89 y.o.    1 Day Post-Op    Procedure(s):  RIGHT HIP HEMIARTHROPLASTY    Subjective: Family reports difficult night. Confusion. Sleeping soundly at present     Vitals:    12/08/24 1000   BP:    Pulse: 84   Resp:    Temp:    SpO2:         Lab Results:  Hemoglobin   Date/Time Value Ref Range Status   12/07/2024 10:06 AM 13.7 11.5 - 16.0 g/dL Final     INR   Date/Time Value Ref Range Status   12/07/2024 12:50 PM 1.0 0.9 - 1.1   Final     Comment:     A single therapeutic range for Vit K antagonists may not be optimal for all indications - see June, 2008 issue of Chest, American College of [employer] Evidence-Based Clinical Practice Guidelines, 8th Edition.       Physical Exam:  GENERAL: 89yo female, sleeping soundly, family at bedside  DRESSING:  Aquacel dressing right anterior hip clean/dry/intact  SWELLING: mild swelling right hip/thigh as expected post-op  NEUROLOGICAL: moving right leg spontaneously  VASCULAR: 2+ DP pulse, right foot warm  DVT Exam: no evidence of DVT seen on physical exam.      Plan:    Leave Aquacel dressing in place x 1 week  DVT prophylaxis: Lovenox 30mg sub q daily  Weight bearing restriction: WBAT  Pain Control: tylenol, dilaudid, ice  Dispo: Anticipate SNF, went to [facility] after hip surgery earlier this year  Will need outpatient follow-up with Dr. Nunes 2 weeks  Discharge instructions on chart    OSCAR Kohli  12/8/2024   10:28 AM  
  Physician Progress Note      PATIENT:               BRENNA PIERRE  [facility] #:                  [id number]  :                       1935  ADMIT DATE:       2024 9:23 AM  DISCH DATE:  RESPONDING  PROVIDER #:        Cornelio Alicea PA-C          QUERY TEXT:    Dear attending,    Pt with a history of lymphoma and left lung adenocarcinoma with current   urinary bladder cancer was admitted with an - Acute impacted superiorly   displaced right femoral neck fracture per the H&P.    If possible, please document in progress notes and discharge summary if you   are evaluating and/or treating any of the following:    The medical record reflects the following:  Risk Factors: hx. lymphoma, Urinary bladder CA    Clinical Indicators: -x-ray pelvis- IMPRESSION:  1.Acute impacted superiorly displaced right femoral neck fracture.  2.Chronic appearing internally fixed left femoral neck fracture without  definite osseous union.Cannot exclude AN unstable deformity.  Per ortho on - Closed fracture of neck of right femur, initial encounter    Treatment: OR, monitor imaging  Options provided:  -- Pathological right femoral neck fracture  -- Traumatic right femoral neck fracture  -- Other - I will add my own diagnosis  -- Disagree - Not applicable / Not valid  -- Disagree - Clinically unable to determine / Unknown  -- Refer to Clinical Documentation Reviewer    PROVIDER RESPONSE TEXT:    This patient has a traumatic right femoral neck fracture.    Query created by: Katina Beth on 2024 10:53 AM      Electronically signed by:  Cornelio Alicea PA-C 2024 11:46 AM          
  Physician Progress Note      PATIENT:               BRENNA PIERRE  [facility] #:                  [id number]  :                       1935  ADMIT DATE:       2024 9:23 AM  DISCH DATE:        12/10/2024 4:04 PM  RESPONDING  PROVIDER #:        Fiorella Jones MD          QUERY TEXT:    Dear attending,    Noted to have documentation of severe malnutrition per the dietary consult of   12/10.    . If possible, please document in progress notes and discharge summary if you   are evaluating and /or treating any of the following:    The medical record reflects the following:  Risk Factors: 12/10/2024 - Current BMI (kg/m2): 16.5  Clinical Indicators: Albumin 3.3  Progress Notes 12/10/2024 - Malnutrition Status: Severe malnutrition (12/10/24   1105)  12/10-per dietary- Daughter says pt has limited food acceptance and a poor   appetite since   May. UBW is 103#, daughter confirms weight loss d/t pt not eating well. Per wt   hx, 121# in March, 113# in April. Represents a 16# wt loss x 9 mo (13%).   Severe muscle wasting/fat loss noted per NFPE.  Malnutrition Status:  Severe malnutrition (12/10/24 1105)  Context:  Chronic Illness  Findings of the 6 clinical characteristics of malnutrition:  Energy Intake:  75% or less estimated energy requirements for 1 month or   longer  Weight Loss:  10% over 6 months  Body Fat Loss:  Severe body fat loss Orbital, Triceps, Buccal region  Muscle Mass Loss:  Severe muscle mass loss Temples (temporalis), Clavicles   (pectoralis & deltoids), Calf (gastrocnemius), Hand (interosseous)  Fluid Accumulation:  No fluid accumulation   Strength:  Not Performed    Nutrition Diagnosis:  Severe malnutrition related to inadequate protein-energy intake, decreased   appetite, limited food acceptance as evidenced by criteria as identified in   malnutrition assessment, patient reported barriers    Treatment: Dietary consult, monitor intake, weight    ASPEN Criteria:  
At around 0230 pt woke up very confused, anxious, tearful, and trying to pull off Ivs, Tele wires, gown etc. Pt Very difficult to redirect vitals stable. Was able to get pt to take meds with grandsons assistance.       0400 Pt goes from sleeping to very confused and pulling at equipment. Grandson at bedside is most effective to help her calm down. Pt has antibiotics due, family asked if we could let pt not be disturbed for a bit.    0500 Family asked if AM labs could be drawn later after sun was up, hoping this would have a positive effect on pt.      
Lovenox Monitoring  Indication: DVT Prophylaxis  Recent Labs     12/07/24  1006 12/07/24  1250   HGB 13.7  --      --    INR  --  1.0     Current Weight: 47.9 kg  Est. CrCl = 31 ml/min  Current Dose: 40 mg subcutaneously every 24 hours.    Plan: Change to 30mg q24h for weight <50.9kg        
ORTHO PROGRESS NOTE    December 10, 2024  Admit Date:   2024    Post Op day: 3 Days Post-Op    Subjective:    Lesly Ybarra states she is feeling better today.  Is more conversant today.  Denies significant right hip pain.  Tolerating diet.  Awaiting transfer to [facility] later today.    PT/OT:   Gait:                    Vital Signs:    Patient Vitals for the past 8 hrs:   BP Temp Temp src Pulse Resp SpO2 Height   12/10/24 1000 -- -- -- 99 -- -- --   12/10/24 0915 -- -- -- -- -- -- 1.702 m (5' 7\")   12/10/24 0904 125/71 97.8 °F (36.6 °C) Oral 93 16 94 % --   12/10/24 0553 -- -- -- 86 -- -- --   12/10/24 0358 -- -- -- 95 -- -- --     Temp (24hrs), Av °F (36.7 °C), Min:97.5 °F (36.4 °C), Max:98.6 °F (37 °C)      Pain Control:        Meds:    Current Facility-Administered Medications   Medication Dose Route Frequency    gabapentin (NEURONTIN) capsule 400 mg  400 mg Oral BID    melatonin tablet 3 mg  3 mg Oral Nightly PRN    sodium chloride flush 0.9 % injection 5-40 mL  5-40 mL IntraVENous 2 times per day    sodium chloride flush 0.9 % injection 5-40 mL  5-40 mL IntraVENous PRN    0.9 % sodium chloride infusion   IntraVENous PRN    polyethylene glycol (GLYCOLAX) packet 17 g  17 g Oral Daily PRN    ondansetron (ZOFRAN) injection 4 mg  4 mg IntraVENous Q6H PRN    HYDROmorphone HCl PF (DILAUDID) injection 0.5 mg  0.5 mg IntraVENous Q4H PRN    naloxone (NARCAN) injection 0.4 mg  0.4 mg IntraVENous PRN    latanoprost (XALATAN) 0.005 % ophthalmic solution 1 drop  1 drop Both Eyes Nightly    LORazepam (ATIVAN) tablet 1 mg  1 mg Oral BID PRN    spironolactone (ALDACTONE) tablet 25 mg  25 mg Oral Daily    tranexamic acid (CYKLOKAPRON) 1,000 mg in sodium chloride 0.9 % 110 mL IVPB (mini-bag)  1,000 mg IntraVENous Once    sodium chloride flush 0.9 % injection 5-40 mL  5-40 mL IntraVENous 2 times per day    sodium chloride flush 0.9 % injection 5-40 mL  5-40 mL IntraVENous PRN    0.9 % sodium chloride infusion   
Occupational Therapy Note:     Pt receiving IV pain meds this morning and per nursing she is more confused this morning.  Will defer at this time and follow up as able.  Thanks,     Norma Mcdonald, OTR/L       
PHYSICAL THERAPY    Followed up after lunch, but patient is still in pain and confused. Follow up tomorrow for mobility assessment.    Tracy Hammond/PT  
PHYSICAL THERAPY    Orders acknowledged and chart reviewed in prep for PT evaluation. Patient receiving IV pain meds at this time and, per nursing, she is more confused this morning. Will defer at this time and follow up as able.     Tracy Hammond/PT  
Patient assessed for readiness to ambulate.   Patient ambulated with assistance of 2 nurses. Patient ambulated with gait belt and walker. Patient walked to {Ortho mobility:64358}. Patient returned safely to bed.     Vitals:    12/08/24 1440   BP: (!) 147/75   Pulse: (!) 101   Resp: 16   Temp: 97.9 °F (36.6 °C)   SpO2: 100%           
Patient was discharged and going to [facility].report was given to the phone to the nurse.  
Physical Therapy 12/7/2024    Orders received and chart reviewed up to date. Pt s/p fall and found to have hip fracture. Pt on strict bedrest and awaiting ortho consult. Will follow-up tomorrow for PT evaluation/ as appropriate.     Thank you.  Joana Ontiveros, PT, DPT    
Pt noted to be mildly tachy per nurse, yelling and increased confusion with high pain. Will attempt to evaluate mobility during needed hygiene and repositioning instead of increasing agitation at this time.     Saray James, DPT, PT    
IntraVENous PRN    0.9 % sodium chloride infusion   IntraVENous PRN    acetaminophen (TYLENOL) tablet 650 mg  650 mg Oral Q6H    bisacodyl (DULCOLAX) EC tablet 5 mg  5 mg Oral Daily PRN    magnesium hydroxide (MILK OF MAGNESIA) 400 MG/5ML suspension 30 mL  30 mL Oral Daily PRN    aluminum & magnesium hydroxide-simethicone (MAALOX) 200-200-20 MG/5ML suspension 15 mL  15 mL Oral Q6H PRN    enoxaparin Sodium (LOVENOX) injection 30 mg  30 mg SubCUTAneous Daily       LAB:    Recent Labs     12/07/24  1250 12/08/24  1039 12/09/24  0539   HCT  --    < > 33.3*   HGB  --    < > 10.9*   INR 1.0  --   --     < > = values in this interval not displayed.       Transfuse PRBC's:      Assessment & Physician's Comment:  Dressing is clean, dry, and intact  Neurovascular checks within normal limits  Orientation:  Disoriented (baseline)  Patient is awake and alert but oriented to self only.  Right hip incision intact with Prineo dressing in place.  No surrounding erythema.  No noted drainage.  Thigh soft and compressible.  Calf soft and nontender bilaterally.  Moves grossly through ankles and toes.  Distal pulses palpable    Principal Problem:    Closed fracture of neck of right femur, initial encounter ([facility])  Resolved Problems:    * No resolved hospital problems. *      Plan:    Status post right hip hemiarthroplasty    PT/OT for mobilization; WBAT through bilateral lower extremities  Tylenol for pain; avoid narcotics secondary to confusion  Family indicated to another provider yesterday the patient had confusion issues after her other hip repair earlier this year.  May need significant reorienting  Lovenox for DVT prophylaxis  Ice packs to right hip as needed  Medical management per primary team    Philipp Alicea PA-C  Orthopedic Trauma Service  [facility]    
Represents a 16# wt loss x 9 mo (13%). Severe muscle wasting/fat loss noted per NFPE. Pt meets criteria for severe malnutrition, RD to continue to monitor.     Nutritionally Significant Medications:  Spironolactone    Estimated Daily Nutrient Needs:  Energy Requirements Based On: Kcal/kg  Weight Used for Energy Requirements: Ideal  Energy (kcal/day): 9431-7761 (28-32 kcal/kg IBW)  Weight Used for Protein Requirements: Ideal  Protein (g/day): 73-92 (1.2-1.5 g/kg IBW)  Method Used for Fluid Requirements: 1 ml/kcal  Fluid (ml/day): 1575-0402 ml    Nutrition Related Findings:   Edema: None                    Recent Labs     12/08/24  1039 12/09/24  0539 12/10/24  0459   GLUCOSE 139* 98 94   BUN 19 20 17   CREATININE 0.78 0.71 0.55   * 135* 136   K 4.6 4.2 4.1    106 105   CO2 22 23 27   CALCIUM 9.3 10.0 9.5     Recent Labs     12/07/24  2223 12/08/24  0309   POCGLU 117 121*     Last BM:      Wounds:   Wound Type: Surgical Incision    Current Nutrition Therapies:  Diet: dysphagia - soft and bite sized  Supplements: none  Meal Intake:   Patient Vitals for the past 168 hrs:   PO Meals Eaten (%)   12/10/24 0904 26 - 50%     Supplement Intake:  Patient Vitals for the past 168 hrs:   PO Supplement (%)   12/10/24 0904 26 - 50%     Nutrition Support: none    Anthropometric Measures:  Height: 170.2 cm (5' 7\")  Ideal Body Weight (IBW): 135 lbs (61 kg)       Current Body Weight: 47.9 kg (105 lb 9.6 oz), 78.2 % IBW. Weight Source: Bed scale  Current BMI (kg/m2): 16.5  Usual Body Weight: 46.7 kg (103 lb)  % Weight Change (Calculated): 2.5  Weight Adjustment For: No Adjustment                 BMI Categories: Underweight (BMI less than 22) age over 65    Wt Readings from Last 10 Encounters:   12/07/24 47.9 kg (105 lb 9.6 oz)   03/07/24 54.9 kg (121 lb)   02/29/24 52.2 kg (115 lb)   06/09/22 57.2 kg (126 lb)   09/03/21 58.5 kg (129 lb)     Nutrition Diagnosis:   Severe malnutrition related to inadequate protein-energy

## (undated) DEVICE — TOTAL JOINT - SMH: Brand: MEDLINE INDUSTRIES, INC.

## (undated) DEVICE — PADDING CAST W6INXL4YD NONSTERILE COT RAYON MICROPLEATED

## (undated) DEVICE — COVER,LIGHT HANDLE,FLX,1/PK: Brand: MEDLINE INDUSTRIES, INC.

## (undated) DEVICE — SOLUTION SURG PREP 26 CC PURPREP

## (undated) DEVICE — SUTURE VICRYL 1 L27IN ABSRB CT BRAID COAT UD J281H

## (undated) DEVICE — APPLICATOR MEDICATED 26 CC SOLUTION HI LT ORNG CHLORAPREP

## (undated) DEVICE — BLADE CLIPPER GEN PURP NS

## (undated) DEVICE — MARKER SKIN 2 INK 8 LBL FLX RULER NO RL TIME OUT SL 2-IN-1

## (undated) DEVICE — 2108 SERIES SAGITTAL BLADE (18.6 X 0.8 X 73.8MM)

## (undated) DEVICE — GLOVE SURG SZ 8 L12IN FNGR THK79MIL GRN LTX FREE

## (undated) DEVICE — 4-PORT MANIFOLD: Brand: NEPTUNE 2

## (undated) DEVICE — SUTURE ETHIBOND EXCEL SZ 5 L30IN NONABSORBABLE GRN L40MM V-37 MB66G

## (undated) DEVICE — DRESSING FOAM POST OPERATIVE 4X10 IN MEPILEX BORDER AG

## (undated) DEVICE — SOLUTION IRRIG 3000ML 0.9% SOD CHL USP UROMATIC PLAS CONT

## (undated) DEVICE — DRAPE,U/ SHT,SPLIT,PLAS,STERIL: Brand: MEDLINE

## (undated) DEVICE — SUTURE STRATAFIX SYMMETRIC PDS + SZ 1 L18IN ABSRB VLT L48MM SXPP1A400

## (undated) DEVICE — HOOD: Brand: FLYTE

## (undated) DEVICE — SYRINGE 20ML LL S/C 50

## (undated) DEVICE — HANDPIECE SET WITH BONE CLEANING TIP AND SUCTION TUBE: Brand: INTERPULSE

## (undated) DEVICE — SUTURE VICRYL ABSORBABLE BRAIDED 2-0 CT 36 IN DA UD  VCP957H

## (undated) DEVICE — SCRUBIN SCRUB BRUSH DRY STER: Brand: MEDLINE INDUSTRIES, INC.

## (undated) DEVICE — DRAPE,MEDI-SLUSH,STERILE: Brand: MEDLINE

## (undated) DEVICE — LIQUIBAND RAPID ADHESIVE 36/CS 0.8ML: Brand: MEDLINE

## (undated) DEVICE — GLOVE ORTHO 7 1/2   MSG9475

## (undated) DEVICE — C-ARM: Brand: UNBRANDED

## (undated) DEVICE — 3M™ STERI-DRAPE™ U-DRAPE 1015: Brand: STERI-DRAPE™

## (undated) DEVICE — SOLUTION WND IRRIGATION 450 ML 0.5 PVP-I 0.9 NACL

## (undated) DEVICE — GAUZE,SPONGE,4"X4",16PLY,STRL,LF,10/TRAY: Brand: MEDLINE

## (undated) DEVICE — 6619 2 PTNT ISO SYS INCISE AREA&LT;(&GT;&&LT;)&GT;P: Brand: STERI-DRAPE™ IOBAN™ 2

## (undated) DEVICE — SYRINGE MED 30ML STD CLR PLAS LUERLOCK TIP N CTRL DISP

## (undated) DEVICE — YANKAUER,FLEXIBLE HANDLE,REGLR CAPACITY: Brand: MEDLINE INDUSTRIES, INC.

## (undated) DEVICE — CONTAINER,SPECIMEN,4OZ,OR STRL: Brand: MEDLINE

## (undated) DEVICE — NEEDLE HYPO 21GA L1IN GRN S STL HUB POLYPR SHLD REG BVL

## (undated) DEVICE — SUTURE MONOCRYL SZ 3-0 L27IN ABSRB UD PS-2 3/8 CIR REV CUT NDL MCP427H